# Patient Record
Sex: FEMALE | Race: WHITE | NOT HISPANIC OR LATINO | Employment: OTHER | URBAN - METROPOLITAN AREA
[De-identification: names, ages, dates, MRNs, and addresses within clinical notes are randomized per-mention and may not be internally consistent; named-entity substitution may affect disease eponyms.]

---

## 2017-03-14 ENCOUNTER — TRANSCRIBE ORDERS (OUTPATIENT)
Dept: ADMINISTRATIVE | Facility: HOSPITAL | Age: 82
End: 2017-03-14

## 2017-03-14 DIAGNOSIS — N18.9 CHRONIC RENAL INSUFFICIENCY, UNSPECIFIED STAGE: Primary | ICD-10-CM

## 2017-03-14 DIAGNOSIS — M81.0 OSTEOPOROSIS: ICD-10-CM

## 2017-03-22 ENCOUNTER — HOSPITAL ENCOUNTER (OUTPATIENT)
Dept: ULTRASOUND IMAGING | Facility: HOSPITAL | Age: 82
Discharge: HOME | End: 2017-03-22
Attending: INTERNAL MEDICINE | Admitting: INTERNAL MEDICINE

## 2017-03-22 ENCOUNTER — HOSPITAL ENCOUNTER (OUTPATIENT)
Dept: BONE DENSITY | Facility: HOSPITAL | Age: 82
Discharge: HOME | End: 2017-03-22
Attending: INTERNAL MEDICINE

## 2017-03-22 DIAGNOSIS — N18.9 CHRONIC RENAL INSUFFICIENCY, UNSPECIFIED STAGE: ICD-10-CM

## 2017-03-22 DIAGNOSIS — M81.0 OSTEOPOROSIS: ICD-10-CM

## 2017-03-22 PROCEDURE — 76775 US EXAM ABDO BACK WALL LIM: CPT

## 2017-03-22 PROCEDURE — 77080 DXA BONE DENSITY AXIAL: CPT

## 2017-04-10 ENCOUNTER — TRANSCRIBE ORDERS (OUTPATIENT)
Dept: ADMINISTRATIVE | Facility: HOSPITAL | Age: 82
End: 2017-04-10

## 2017-04-10 DIAGNOSIS — N28.89 RENAL MASS, LEFT: Primary | ICD-10-CM

## 2017-04-11 ENCOUNTER — HOSPITAL ENCOUNTER (OUTPATIENT)
Dept: CT IMAGING | Facility: HOSPITAL | Age: 82
Discharge: HOME | End: 2017-04-11
Attending: INTERNAL MEDICINE | Admitting: INTERNAL MEDICINE

## 2017-04-11 DIAGNOSIS — N28.89 RENAL MASS, LEFT: ICD-10-CM

## 2017-04-11 PROCEDURE — 74150 CT ABDOMEN W/O CONTRAST: CPT

## 2017-04-19 ENCOUNTER — TRANSCRIBE ORDERS (OUTPATIENT)
Dept: ADMINISTRATIVE | Facility: HOSPITAL | Age: 82
End: 2017-04-19

## 2017-04-19 DIAGNOSIS — D41.00 NEOPLASM OF UNCERTAIN BEHAVIOR OF KIDNEY, UNSPECIFIED LATERALITY: Primary | ICD-10-CM

## 2017-04-20 ENCOUNTER — TRANSCRIBE ORDERS (OUTPATIENT)
Dept: ADMINISTRATIVE | Facility: HOSPITAL | Age: 82
End: 2017-04-20

## 2017-04-20 DIAGNOSIS — Z12.31 ENCOUNTER FOR SCREENING MAMMOGRAM FOR MALIGNANT NEOPLASM OF BREAST: Primary | ICD-10-CM

## 2017-04-24 ENCOUNTER — HOSPITAL ENCOUNTER (OUTPATIENT)
Dept: MRI IMAGING | Facility: HOSPITAL | Age: 82
Discharge: HOME | End: 2017-04-24
Attending: UROLOGY | Admitting: UROLOGY

## 2017-04-24 DIAGNOSIS — D41.00 NEOPLASM OF UNCERTAIN BEHAVIOR OF KIDNEY, UNSPECIFIED LATERALITY: ICD-10-CM

## 2017-04-24 PROCEDURE — 74183 MRI ABD W/O CNTR FLWD CNTR: CPT

## 2017-04-24 PROCEDURE — A9577 INJ MULTIHANCE: HCPCS | Performed by: UROLOGY

## 2017-04-24 PROCEDURE — 82565 ASSAY OF CREATININE: CPT

## 2017-04-24 PROCEDURE — 0 GADOBENATE DIMEGLUMINE 529 MG/ML SOLUTION: Performed by: UROLOGY

## 2017-04-24 RX ADMIN — GADOBENATE DIMEGLUMINE 13 ML: 529 INJECTION, SOLUTION INTRAVENOUS at 07:00

## 2017-04-26 LAB — CREAT BLDA-MCNC: 1.1 MG/DL (ref 0.6–1.3)

## 2018-02-13 ENCOUNTER — OFFICE (OUTPATIENT)
Dept: URBAN - METROPOLITAN AREA OTHER 6 | Facility: OTHER | Age: 83
End: 2018-02-13

## 2018-02-13 VITALS
WEIGHT: 153 LBS | HEART RATE: 74 BPM | DIASTOLIC BLOOD PRESSURE: 70 MMHG | HEIGHT: 66 IN | SYSTOLIC BLOOD PRESSURE: 112 MMHG

## 2018-02-13 DIAGNOSIS — K59.00 CONSTIPATION, UNSPECIFIED: ICD-10-CM

## 2018-02-13 DIAGNOSIS — K21.9 GASTRO-ESOPHAGEAL REFLUX DISEASE WITHOUT ESOPHAGITIS: ICD-10-CM

## 2018-02-13 PROCEDURE — 99212 OFFICE O/P EST SF 10 MIN: CPT

## 2019-08-23 ENCOUNTER — ANESTHESIA EVENT (OUTPATIENT)
Dept: GASTROENTEROLOGY | Facility: HOSPITAL | Age: 84
End: 2019-08-23

## 2019-08-23 ENCOUNTER — ANESTHESIA (OUTPATIENT)
Dept: GASTROENTEROLOGY | Facility: HOSPITAL | Age: 84
End: 2019-08-23

## 2019-08-23 ENCOUNTER — HOSPITAL ENCOUNTER (OUTPATIENT)
Facility: HOSPITAL | Age: 84
Setting detail: HOSPITAL OUTPATIENT SURGERY
Discharge: HOME | End: 2019-08-23
Attending: INTERNAL MEDICINE | Admitting: INTERNAL MEDICINE

## 2019-08-23 ENCOUNTER — ON CAMPUS - OUTPATIENT (OUTPATIENT)
Dept: URBAN - METROPOLITAN AREA HOSPITAL 114 | Facility: HOSPITAL | Age: 84
End: 2019-08-23

## 2019-08-23 VITALS
HEIGHT: 66 IN | RESPIRATION RATE: 16 BRPM | OXYGEN SATURATION: 96 % | WEIGHT: 156.44 LBS | BODY MASS INDEX: 25.14 KG/M2 | SYSTOLIC BLOOD PRESSURE: 136 MMHG | HEART RATE: 63 BPM | DIASTOLIC BLOOD PRESSURE: 79 MMHG | TEMPERATURE: 97.7 F

## 2019-08-23 DIAGNOSIS — K31.89 OTHER DISEASES OF STOMACH AND DUODENUM: ICD-10-CM

## 2019-08-23 DIAGNOSIS — R68.81 EARLY SATIETY: ICD-10-CM

## 2019-08-23 DIAGNOSIS — K44.9 DIAPHRAGMATIC HERNIA WITHOUT OBSTRUCTION OR GANGRENE: ICD-10-CM

## 2019-08-23 DIAGNOSIS — R12 HEARTBURN: ICD-10-CM

## 2019-08-23 DIAGNOSIS — R10.13 DYSPEPSIA: ICD-10-CM

## 2019-08-23 PROCEDURE — 88305 TISSUE EXAM BY PATHOLOGIST: CPT | Performed by: INTERNAL MEDICINE

## 2019-08-23 PROCEDURE — 87071 CULTURE AEROBIC QUANT OTHER: CPT | Performed by: INTERNAL MEDICINE

## 2019-08-23 PROCEDURE — 43239 EGD BIOPSY SINGLE/MULTIPLE: CPT | Performed by: INTERNAL MEDICINE

## 2019-08-23 PROCEDURE — 25010000002 PROPOFOL 10 MG/ML EMULSION: Performed by: ANESTHESIOLOGY

## 2019-08-23 RX ORDER — LIDOCAINE HYDROCHLORIDE 20 MG/ML
INJECTION, SOLUTION INFILTRATION; PERINEURAL AS NEEDED
Status: DISCONTINUED | OUTPATIENT
Start: 2019-08-23 | End: 2019-08-23 | Stop reason: SURG

## 2019-08-23 RX ORDER — SODIUM CHLORIDE, SODIUM LACTATE, POTASSIUM CHLORIDE, CALCIUM CHLORIDE 600; 310; 30; 20 MG/100ML; MG/100ML; MG/100ML; MG/100ML
1000 INJECTION, SOLUTION INTRAVENOUS CONTINUOUS
Status: DISCONTINUED | OUTPATIENT
Start: 2019-08-23 | End: 2019-08-23 | Stop reason: HOSPADM

## 2019-08-23 RX ORDER — SIMVASTATIN 40 MG
40 TABLET ORAL NIGHTLY
COMMUNITY

## 2019-08-23 RX ORDER — PROPOFOL 10 MG/ML
VIAL (ML) INTRAVENOUS AS NEEDED
Status: DISCONTINUED | OUTPATIENT
Start: 2019-08-23 | End: 2019-08-23 | Stop reason: SURG

## 2019-08-23 RX ORDER — ZOLPIDEM TARTRATE 5 MG/1
5 TABLET ORAL NIGHTLY PRN
COMMUNITY
End: 2022-08-29

## 2019-08-23 RX ORDER — PANTOPRAZOLE SODIUM 40 MG/1
40 TABLET, DELAYED RELEASE ORAL DAILY
COMMUNITY

## 2019-08-23 RX ORDER — SODIUM CHLORIDE 0.9 % (FLUSH) 0.9 %
3 SYRINGE (ML) INJECTION AS NEEDED
Status: DISCONTINUED | OUTPATIENT
Start: 2019-08-23 | End: 2019-08-23 | Stop reason: HOSPADM

## 2019-08-23 RX ORDER — IBANDRONATE SODIUM 150 MG/1
150 TABLET, FILM COATED ORAL
COMMUNITY

## 2019-08-23 RX ORDER — CITALOPRAM 40 MG/1
40 TABLET ORAL DAILY
COMMUNITY

## 2019-08-23 RX ORDER — PROPOFOL 10 MG/ML
VIAL (ML) INTRAVENOUS CONTINUOUS PRN
Status: DISCONTINUED | OUTPATIENT
Start: 2019-08-23 | End: 2019-08-23 | Stop reason: SURG

## 2019-08-23 RX ADMIN — PROPOFOL 100 MG: 10 INJECTION, EMULSION INTRAVENOUS at 10:40

## 2019-08-23 RX ADMIN — LIDOCAINE HYDROCHLORIDE 60 MG: 20 INJECTION, SOLUTION INFILTRATION; PERINEURAL at 10:40

## 2019-08-23 RX ADMIN — PROPOFOL 100 MCG/KG/MIN: 10 INJECTION, EMULSION INTRAVENOUS at 10:40

## 2019-08-23 RX ADMIN — SODIUM CHLORIDE, POTASSIUM CHLORIDE, SODIUM LACTATE AND CALCIUM CHLORIDE 1000 ML: 600; 310; 30; 20 INJECTION, SOLUTION INTRAVENOUS at 10:02

## 2019-08-23 NOTE — ANESTHESIA POSTPROCEDURE EVALUATION
"Patient: Maria R Sandoval    Procedure Summary     Date:  08/23/19 Room / Location:   [facility] /  [facility]    Anesthesia Start:  1036 Anesthesia Stop:  1102    Procedure:  ESOPHAGOGASTRODUODENOSCOPY WITH DUODENAL ASPIRATE AND BIOPSY (N/A Esophagus) Diagnosis:      Surgeon:  Curtis Dillard MD Provider:  Ricci James MD    Anesthesia Type:  MAC ASA Status:  2          Anesthesia Type: MAC  Last vitals  BP   136/79 (08/23/19 1121)   Temp   36.5 °C (97.7 °F) (08/23/19 0945)   Pulse   63 (08/23/19 1121)   Resp   16 (08/23/19 1121)     SpO2   96 % (08/23/19 1121)     Post Anesthesia Care and Evaluation    Patient location during evaluation: PACU  Patient participation: complete - patient participated  Level of consciousness: awake  Pain score: 0  Pain management: adequate  Airway patency: patent  Anesthetic complications: No anesthetic complications  PONV Status: none  Cardiovascular status: acceptable  Respiratory status: acceptable  Hydration status: acceptable    Comments: /79 (BP Location: Left arm, Patient Position: Sitting)   Pulse 63   Temp 36.5 °C (97.7 °F) (Oral)   Resp 16   Ht 167.6 cm (66\")   Wt 71 kg (156 lb 7 oz)   SpO2 96%   BMI 25.25 kg/m²       "

## 2019-08-23 NOTE — ANESTHESIA PREPROCEDURE EVALUATION
Anesthesia Evaluation     Patient summary reviewed and Nursing notes reviewed                Airway   Mallampati: I  TM distance: >3 FB  Neck ROM: full  No difficulty expected  Dental - normal exam     Pulmonary - negative pulmonary ROS and normal exam   Cardiovascular - normal exam    (+) hypertension, hyperlipidemia,       Neuro/Psych- negative ROS  GI/Hepatic/Renal/Endo    (+)  GERD,      Musculoskeletal     Abdominal  - normal exam    Bowel sounds: normal.   Substance History - negative use     OB/GYN negative ob/gyn ROS         Other   (+) arthritis                     Anesthesia Plan    ASA 2     MAC     Anesthetic plan, all risks, benefits, and alternatives have been provided, discussed and informed consent has been obtained with: patient.

## 2019-08-23 NOTE — H&P
Patient Care Team:  Adeel Interiano MD as PCP - General (Internal Medicine)    Chief complaint reflux, prior ulcer     Subjective     History of Present Illness  States she has upper abdominal pain, fills up easily  Review of Systems   Gastrointestinal: Positive for abdominal pain.        Past Medical History:   Diagnosis Date   • Arthritis    • Elevated cholesterol    • GERD (gastroesophageal reflux disease)    • Hypertension      Past Surgical History:   Procedure Laterality Date   • ABDOMINOPLASTY N/A    • ANKLE SURGERY Right    • BILATERAL BREAST REDUCTION     • COLONOSCOPY N/A 2016    Procedure: COLONOSCOPY to terminal ileum;  Surgeon: Curtis Dillard MD;  Location: [facility] END[facility]COPY;  Service:    • ENDOSCOPY N/A 2016    Procedure: ESOPHAGOGASTRODUODENOSCOPY with bx;  Surgeon: Curtis Dillard MD;  Location: [facility];  Service:    • EYE SURGERY Right    • FACELIFT     • REPLACEMENT TOTAL KNEE Right    • ROTATOR CUFF REPAIR Left    • TONSILLECTOMY     • WRIST SURGERY Right      Family History   Problem Relation Age of Onset   • Cancer Maternal Grandmother      Social History     Tobacco Use   • Smoking status: Former Smoker     Last attempt to quit: 1985     Years since quittin.7   Substance Use Topics   • Alcohol use: No   • Drug use: No     Medications Prior to Admission   Medication Sig Dispense Refill Last Dose   • Calcium Carb-Cholecalciferol (CALCIUM 600 + D PO) Take  by mouth.   2019 at Unknown time   • citalopram (CeleXA) 40 MG tablet Take 40 mg by mouth Daily.   2019 at Unknown time   • ibandronate (BONIVA) 150 MG tablet Take 150 mg by mouth Every 30 (Thirty) Days.      • Mirabegron (MYRBETRIQ PO) Take  by mouth.   2019 at Unknown time   • Multiple Vitamins-Calcium (ONE-A-DAY WOMENS PO) Take  by mouth.   2019 at Unknown time   • pantoprazole (PROTONIX) 40 MG EC tablet Take 40 mg by mouth Daily.      • simvastatin (ZOCOR) 40 MG tablet Take 40 mg by mouth  Every Night.      • triamterene (DYRENIUM) 50 MG capsule Take 25 mg by mouth Daily.   Past Week at Unknown time   • zolpidem (AMBIEN) 5 MG tablet Take 5 mg by mouth At Night As Needed for Sleep.        Allergies:  Codeine sulfate    Objective      Vital Signs  Temp:  [97.7 °F (36.5 °C)] 97.7 °F (36.5 °C)  Heart Rate:  [68] 68  Resp:  [11] 11  BP: (128)/(76) 128/76    Physical Exam   Constitutional: She is oriented to person, place, and time. She appears well-developed and well-nourished.   HENT:   Mouth/Throat: Oropharynx is clear and moist.   Eyes: Conjunctivae are normal.   Neck: Neck supple.   Cardiovascular: Normal rate and regular rhythm.   Pulmonary/Chest: Effort normal and breath sounds normal.   Abdominal: Soft. Bowel sounds are normal.   Neurological: She is alert and oriented to person, place, and time.   Skin: Skin is warm and dry.   Psychiatric: She has a normal mood and affect.       Results Review:   I reviewed the patient's new clinical results.      Assessment/Plan       * No active hospital problems. *      Assessment:  (Gastroesophageal reflux disease  Early satiety  dyspepsia).     Plan:   (Upper tract endoscopy, risks, alternatives and benefits discussed with patient and patient is agreeable to proceed).       I discussed the patients findings and my recommendations with patient and nursing staff    Curtis Dillard MD  08/23/19  10:40 AM     Kaitlin -

## 2019-08-23 NOTE — DISCHARGE INSTRUCTIONS
For the next 24 hours patient needs to be with a responsible adult.    For 24 hours DO NOT drive, operate machinery, appliances, drink alcohol, make important decisions or sign legal documents.    Start with a light or bland diet if you are feeling sick to your stomach otherwise advance to regular diet as tolerated.    Follow recommendations on procedure report if provided by your doctor.    Call Dr Dillard for problems [phone]    Problems may include but not limited to: large amounts of bleeding, trouble breathing, repeated vomiting, severe unrelieved pain, fever or chills.

## 2019-08-25 LAB
BACTERIA SPEC AEROBE CULT: ABNORMAL
BACTERIA SPEC AEROBE CULT: ABNORMAL

## 2019-08-26 LAB
CYTO UR: NORMAL
LAB AP CASE REPORT: NORMAL
PATH REPORT.FINAL DX SPEC: NORMAL
PATH REPORT.GROSS SPEC: NORMAL

## 2021-01-13 ENCOUNTER — OFFICE VISIT (OUTPATIENT)
Dept: ORTHOPEDIC SURGERY | Facility: CLINIC | Age: 86
End: 2021-01-13

## 2021-01-13 VITALS — WEIGHT: 160 LBS | BODY MASS INDEX: 25.71 KG/M2 | TEMPERATURE: 97.3 F | HEIGHT: 66 IN

## 2021-01-13 DIAGNOSIS — R52 PAIN: ICD-10-CM

## 2021-01-13 DIAGNOSIS — M17.12 PRIMARY OSTEOARTHRITIS OF LEFT KNEE: Primary | ICD-10-CM

## 2021-01-13 DIAGNOSIS — Z96.651 STATUS POST TOTAL RIGHT KNEE REPLACEMENT: ICD-10-CM

## 2021-01-13 PROCEDURE — 73562 X-RAY EXAM OF KNEE 3: CPT | Performed by: ORTHOPAEDIC SURGERY

## 2021-01-13 PROCEDURE — 99203 OFFICE O/P NEW LOW 30 MIN: CPT | Performed by: ORTHOPAEDIC SURGERY

## 2021-01-13 PROCEDURE — 20610 DRAIN/INJ JOINT/BURSA W/O US: CPT | Performed by: ORTHOPAEDIC SURGERY

## 2021-01-13 RX ORDER — METHYLPREDNISOLONE ACETATE 80 MG/ML
80 INJECTION, SUSPENSION INTRA-ARTICULAR; INTRALESIONAL; INTRAMUSCULAR; SOFT TISSUE
Status: COMPLETED | OUTPATIENT
Start: 2021-01-13 | End: 2021-01-13

## 2021-01-13 RX ORDER — OLOPATADINE HYDROCHLORIDE 7 MG/ML
SOLUTION OPHTHALMIC
COMMUNITY
Start: 2020-10-19

## 2021-01-13 RX ORDER — MELOXICAM 15 MG/1
TABLET ORAL DAILY
COMMUNITY
Start: 2020-11-12

## 2021-01-13 RX ADMIN — METHYLPREDNISOLONE ACETATE 80 MG: 80 INJECTION, SUSPENSION INTRA-ARTICULAR; INTRALESIONAL; INTRAMUSCULAR; SOFT TISSUE at 09:35

## 2021-01-13 NOTE — PROGRESS NOTES
Patient Name: Maria R Sandoval   YOB: 1935  Referring Primary Care Physician: Adeel Interiano MD  BMI: Body mass index is 25.82 kg/m².    Chief Complaint:    Chief Complaint   Patient presents with   • Left Knee - Establish Care   • Right Knee - Establish Care        HPI:     Maria R Sandoval is a 85 y.o. female who presents today for evaluation of   Chief Complaint   Patient presents with   • Left Knee - Establish Care   • Right Knee - Establish Care   .  Mrs. Sutton is seen today complaining of some bilateral knee pain but mostly on the left.  Denies any trauma she had a right total knee many years ago.  Left knee started to bother more more and she think she is relying on the other side.  She does have some back pain and her the distance of ambulation is somewhat limited as well.  She does not have any pain at rest      Subjective   Medications:   Home Medications:  Current Outpatient Medications on File Prior to Visit   Medication Sig   • Calcium Carb-Cholecalciferol (CALCIUM 600 + D PO) Take  by mouth.   • citalopram (CeleXA) 40 MG tablet Take 40 mg by mouth Daily.   • ibandronate (BONIVA) 150 MG tablet Take 150 mg by mouth Every 30 (Thirty) Days.   • meloxicam (MOBIC) 15 MG tablet Take  by mouth Daily.   • Mirabegron (MYRBETRIQ PO) Take  by mouth.   • Multiple Vitamins-Calcium (ONE-A-DAY WOMENS PO) Take  by mouth.   • pantoprazole (PROTONIX) 40 MG EC tablet Take 40 mg by mouth Daily.   • Pazeo 0.7 % solution INT 1 GTT IN OU QAM   • simvastatin (ZOCOR) 40 MG tablet Take 40 mg by mouth Every Night.   • triamterene (DYRENIUM) 50 MG capsule Take 25 mg by mouth Daily.   • zolpidem (AMBIEN) 5 MG tablet Take 5 mg by mouth At Night As Needed for Sleep.     No current facility-administered medications on file prior to visit.      Current Medications:  Scheduled Meds:  Continuous Infusions:No current facility-administered medications for this visit.     PRN Meds:.    I have reviewed the  patient's medical history in detail and updated the computerized patient record.  Review and summarization of old records includes:    Past Medical History:   Diagnosis Date   • Arthritis    • Elevated cholesterol    • GERD (gastroesophageal reflux disease)    • Hypertension         Past Surgical History:   Procedure Laterality Date   • ABDOMINOPLASTY N/A    • ANKLE SURGERY Right    • BILATERAL BREAST REDUCTION     • COLONOSCOPY N/A 2016    Procedure: COLONOSCOPY to terminal ileum;  Surgeon: Curtis Dillard MD;  Location: [facility] ENDOSCOPY;  Service:    • ENDOSCOPY N/A 2016    Procedure: ESOPHAGOGASTRODUODENOSCOPY with bx;  Surgeon: Curtis Dillard MD;  Location: [facility] ENDOSCOPY;  Service:    • ENDOSCOPY N/A 2019    Procedure: ESOPHAGOGASTRODUODENOSCOPY WITH DUODENAL ASPIRATE AND BIOPSY;  Surgeon: Curtis Dillard MD;  Location: [facility] ENDOSCOPY;  Service: Gastroenterology   • EYE SURGERY Right    • FACELIFT     • REPLACEMENT TOTAL KNEE Right    • ROTATOR CUFF REPAIR Left    • TONSILLECTOMY     • WRIST SURGERY Right         Social History     Occupational History   • Not on file   Tobacco Use   • Smoking status: Former Smoker     Quit date: 1985     Years since quittin.1   • Smokeless tobacco: Never Used   Substance and Sexual Activity   • Alcohol use: No   • Drug use: No   • Sexual activity: Not on file      Social History     Social History Narrative   • Not on file        Family History   Problem Relation Age of Onset   • Cancer Maternal Grandmother        ROS: 14 point review of systems was performed and all other systems were reviewed and are negative except for documented findings in HPI and today's encounter.     Allergies:   Allergies   Allergen Reactions   • Codeine Sulfate Rash     Constitutional:  Denies fever, shaking or chills   Eyes:  Denies change in visual acuity   HENT:  Denies nasal congestion or sore throat   Respiratory:  Denies cough or shortness of breath  "  Cardiovascular:  Denies chest pain or severe LE edema   GI:  Denies abdominal pain, nausea, vomiting, bloody stools or diarrhea   Musculoskeletal:  Numbness, tingling, pain, or loss of motor function only as noted above in history of present illness.  : Denies painful urination or hematuria  Integument:  Denies rash, lesion or ulceration   Neurologic:  Denies headache or focal weakness  Endocrine:  Denies lymphadenopathy  Psych:  Denies confusion or change in mental status   Hem:  Denies active bleeding    OBJECTIVE:  Physical Exam: 85 y.o. female  Wt Readings from Last 3 Encounters:   01/13/21 72.6 kg (160 lb)   08/23/19 71 kg (156 lb 7 oz)   04/24/17 68 kg (150 lb)     Ht Readings from Last 1 Encounters:   01/13/21 167.6 cm (66\")     Body mass index is 25.82 kg/m².  Vitals:    01/13/21 0925   Temp: 97.3 °F (36.3 °C)     Vital signs reviewed.     General Appearance:    Alert, cooperative, in no acute distress                  Eyes: conjunctiva clear  ENT: external ears and nose atraumatic  CV: no peripheral edema  Resp: normal respiratory effort  Skin: no rashes or wounds; normal turgor  Psych: mood and affect appropriate  Lymph: no nodes appreciated  Neuro: gross sensation intact  Vascular:  Palpable peripheral pulse in noted extremity  Musculoskeletal Extremities: Examination today replaced knee goes 0 to about 125 degrees there is mild varus valgus laxity but no instability is demonstrated and there is minimal swelling.  Not tender to the touch her left knee has swelling crepitation synovitis joint line tenderness and pseudolaxity    Radiology:   AP lateral 40 degree PA x-ray bilateral knees taken the office today for pain with comparison view shows a well-placed well fixed well aligned total knee arthroplasty on the right with arthritis on the left is relatively advanced.  Reviewing her old x-rays AP lateral lumbar spine from several years ago she has scoliosis and degenerative disc disease may perhaps have " some stenosis adding to the symptoms.  Also reviewed previous AP pelvis that shows bilateral sacroiliac changes as well she is tender in that area as well    Assessment:     ICD-10-CM ICD-9-CM   1. Pain  R52 780.96   2. Status post total right knee replacement  Z96.651 V43.65   3. Primary osteoarthritis of left knee  M17.12 715.16        Large Joint Arthrocentesis: L knee  Date/Time: 1/13/2021 9:35 AM  Consent given by: patient  Site marked: site marked  Timeout: Immediately prior to procedure a time out was called to verify the correct patient, procedure, equipment, support staff and site/side marked as required   Supporting Documentation  Indications: pain and joint swelling   Procedure Details  Location: knee - L knee  Preparation: Patient was prepped and draped in the usual sterile fashion  Needle gauge: 21.  Approach: anterolateral  Medications administered: 4 mL lidocaine (cardiac); 80 mg methylPREDNISolone acetate 80 MG/ML  Patient tolerance: patient tolerated the procedure well with no immediate complications             Plan: The diagnosis(es), natural history, pathophysiology and treatment for diagnosis(es) were discussed. Opportunity given and questions answered.  Biomechanics of pertinent body areas discussed.  When appropriate, the use of ambulatory aids discussed.  BMI:  The concept of BMI body mass index and its importance and implications discussed.    Inflammation/pain control; with cold, heat, elevation and/or liniments discussed as appropriate  Cortisone Injection. See procedure note.  We discussed options including total knee arthroplasty.  She is 85 in reasonably good health.  Not sure if she wants to do that but we did discuss and answer questions.  When we see her back about 3 months to see how she is getting along      1/13/2021    Much of this encounter note is an electronic transcription/translation of spoken language to printed text. The electronic translation of spoken language may permit  erroneous, or at times, nonsensical words or phrases to be inadvertently transcribed; Although I have reviewed the note for such errors, some may still exist

## 2021-04-15 ENCOUNTER — OFFICE VISIT (OUTPATIENT)
Dept: ORTHOPEDIC SURGERY | Facility: CLINIC | Age: 86
End: 2021-04-15

## 2021-04-15 VITALS — TEMPERATURE: 96.8 F | BODY MASS INDEX: 25.72 KG/M2 | WEIGHT: 160.05 LBS | HEIGHT: 66 IN

## 2021-04-15 DIAGNOSIS — M17.12 ARTHRITIS OF LEFT KNEE: Primary | ICD-10-CM

## 2021-04-15 DIAGNOSIS — Z96.651 STATUS POST TOTAL RIGHT KNEE REPLACEMENT: ICD-10-CM

## 2021-04-15 PROCEDURE — 99214 OFFICE O/P EST MOD 30 MIN: CPT | Performed by: ORTHOPAEDIC SURGERY

## 2021-04-15 NOTE — PROGRESS NOTES
Patient Name: Maria R Sandoval   YOB: 1935  Referring Primary Care Physician: Adeel Interiano MD  BMI: Body mass index is 25.85 kg/m².    Chief Complaint:    Chief Complaint   Patient presents with   • Left Knee - Follow-up, Pain   • Right Knee - Follow-up, Pain        HPI:     Maria R Sandoval is a 85 y.o. female who presents today for evaluation of   Chief Complaint   Patient presents with   • Left Knee - Follow-up, Pain   • Right Knee - Follow-up, Pain     She is status post right knee arthroplasty and left knee pain. The left knee has swelling and hurts in the medial and posterior aspects of the knee. She reports instability in the left knee as well. She walks with a cane. She asked about stem cell treatments and knee replacement. She lives alone but has daughters who can come visit to help her. Additionally she has some bilateral hip pain.     Subjective   Medications:   Home Medications:  Current Outpatient Medications on File Prior to Visit   Medication Sig   • Calcium 280 MG tablet Take  by mouth.   • Calcium Carb-Cholecalciferol (CALCIUM 600 + D PO) Take  by mouth.   • citalopram (CeleXA) 40 MG tablet Take 40 mg by mouth Daily.   • ibandronate (BONIVA) 150 MG tablet Take 150 mg by mouth Every 30 (Thirty) Days.   • meloxicam (MOBIC) 15 MG tablet Take  by mouth Daily.   • Mirabegron (MYRBETRIQ PO) Take  by mouth.   • Multiple Vitamins-Calcium (ONE-A-DAY WOMENS PO) Take  by mouth.   • pantoprazole (PROTONIX) 40 MG EC tablet Take 40 mg by mouth Daily.   • Pazeo 0.7 % solution INT 1 GTT IN OU QAM   • simvastatin (ZOCOR) 40 MG tablet Take 40 mg by mouth Every Night.   • triamterene (DYRENIUM) 50 MG capsule Take 25 mg by mouth Daily.   • zolpidem (AMBIEN) 5 MG tablet Take 5 mg by mouth At Night As Needed for Sleep.     No current facility-administered medications on file prior to visit.     Current Medications:  Scheduled Meds:  Continuous Infusions:No current facility-administered  medications for this visit.    PRN Meds:.    I have reviewed the patient's medical history in detail and updated the computerized patient record.  Review and summarization of old records includes:    Past Medical History:   Diagnosis Date   • Arthritis    • Elevated cholesterol    • GERD (gastroesophageal reflux disease)    • Hypertension         Past Surgical History:   Procedure Laterality Date   • ABDOMINOPLASTY N/A    • ANKLE SURGERY Right    • BILATERAL BREAST REDUCTION     • COLONOSCOPY N/A 2016    Procedure: COLONOSCOPY to terminal ileum;  Surgeon: Curtis Dillard MD;  Location: [facility] ENDOSCOPY;  Service:    • ENDOSCOPY N/A 2016    Procedure: ESOPHAGOGASTRODUODENOSCOPY with bx;  Surgeon: Curtis Dillard MD;  Location: [facility] ENDOSCOPY;  Service:    • ENDOSCOPY N/A 2019    Procedure: ESOPHAGOGASTRODUODENOSCOPY WITH DUODENAL ASPIRATE AND BIOPSY;  Surgeon: Curtis Dillard MD;  Location: [facility] ENDOSCOPY;  Service: Gastroenterology   • EYE SURGERY Right    • FACELIFT     • REPLACEMENT TOTAL KNEE Right    • ROTATOR CUFF REPAIR Left    • TONSILLECTOMY     • WRIST SURGERY Right         Social History     Occupational History   • Not on file   Tobacco Use   • Smoking status: Former Smoker     Quit date: 1985     Years since quittin.3   • Smokeless tobacco: Never Used   Substance and Sexual Activity   • Alcohol use: No   • Drug use: No   • Sexual activity: Not on file      Social History     Social History Narrative   • Not on file        Family History   Problem Relation Age of Onset   • Cancer Maternal Grandmother        ROS: 14 point review of systems was performed and all other systems were reviewed and are negative except for documented findings in HPI and today's encounter.     Allergies:   Allergies   Allergen Reactions   • Raloxifene Other (See Comments)     Other reaction(s): LEG CRAMPING   • Risedronate Other (See Comments)   • Codeine Sulfate Rash   • Hydrocodone-Acetaminophen  "Rash     Constitutional:  Denies fever, shaking or chills   Eyes:  Denies change in visual acuity   HENT:  Denies nasal congestion or sore throat   Respiratory:  Denies cough or shortness of breath   Cardiovascular:  Denies chest pain or severe LE edema   GI:  Denies abdominal pain, nausea, vomiting, bloody stools or diarrhea   Musculoskeletal:  Numbness, tingling, pain, or loss of motor function only as noted above in history of present illness.  : Denies painful urination or hematuria  Integument:  Denies rash, lesion or ulceration   Neurologic:  Denies headache or focal weakness  Endocrine:  Denies lymphadenopathy  Psych:  Denies confusion or change in mental status   Hem:  Denies active bleeding    OBJECTIVE:  Physical Exam: 85 y.o. female  Wt Readings from Last 3 Encounters:   04/15/21 72.6 kg (160 lb 0.9 oz)   01/13/21 72.6 kg (160 lb)   08/23/19 71 kg (156 lb 7 oz)     Ht Readings from Last 1 Encounters:   04/15/21 167.6 cm (65.98\")     Body mass index is 25.85 kg/m².  Vitals:    04/15/21 1035   Temp: 96.8 °F (36 °C)     Vital signs reviewed.     General Appearance:    Alert, cooperative, in no acute distress                  Eyes: conjunctiva clear  ENT: external ears and nose atraumatic  CV: no peripheral edema  Resp: normal respiratory effort  Skin: no rashes or wounds; normal turgor  Psych: mood and affect appropriate  Lymph: no nodes appreciated  Neuro: gross sensation intact  Vascular:  Palpable peripheral pulse in noted extremity  Musculoskeletal Extremities: Left knee: Swelling present on exam with good range of motion.   Right hip: Tenderness to the right sacroiliac joint. stinchfield negative, no pain with internal and external rotation     Radiology:   AP lateral 40 degree PA x-ray bilateral knees taken the office in the past with comparison views for pain and reviewed for possible surgery show total knee arthroplasty on the right with advanced arthritis left knee      Procedures      Assessment: "     ICD-10-CM ICD-9-CM   1. Arthritis of left knee  M17.12 716.96   2. Status post total right knee replacement  Z96.651 V43.65        MDM/Plan:   The diagnosis(es), natural history, pathophysiology and treatment for diagnosis(es) were discussed. Opportunity given and questions answered.  Biomechanics of pertinent body areas discussed.  When appropriate, the use of ambulatory aids discussed. She does not currently want a cortisone injection but suspects she will want it in the future.     Inflammation/pain control; with cold, heat, elevation and/or liniments discussed as appropriate  TKA: Continuation of conservative management vs. TKA: I reviewed anatomy of a total knee arthroplasty in laymen's terms, as well as typical postoperative recovery and possibly 6-12 months for maximal recovery, and possible need for rehabilitation stay after hospitalization. We also discussed risks, benefits, alternatives, and limitations of procedure with risks including but not limited to neurovascular damage, bleeding, infection, malalignment, chronic pain, failure of implants, osteolysis, loosening of implants, loss of motion, weakness, stiffness, instability, DVT, pulmonary embolus, death, stroke, complex regional pain syndrome, myocardial infarction, and need for additional procedures. Concept of substitution vs. replacement discussed.  No guarantees were given regarding results of surgery.  Patient verbalized understanding, and was given the opportunity to ask and have all questions answered today.     Scribed for Christiano Dillard MD by SEBASTIEN HENNING.  04/15/21   12:50 EDT    I have personally performed the services described in this document as scribed by the above individual, and it is both accurate and complete.  Christiano Dillard MD  4/16/2021  11:39 EDT

## 2021-06-01 ENCOUNTER — TELEPHONE (OUTPATIENT)
Dept: ORTHOPEDIC SURGERY | Facility: CLINIC | Age: 86
End: 2021-06-01

## 2022-03-07 ENCOUNTER — TELEPHONE (OUTPATIENT)
Dept: ONCOLOGY | Facility: CLINIC | Age: 87
End: 2022-03-07

## 2022-03-07 NOTE — TELEPHONE ENCOUNTER
Caller: Maria R Sandoval    Relationship to patient: Self    Best call back number: [phone]    Chief complaint: R/S NEW PT APPT    Type of visit: LAB NEW PT APPT    Requested date: 3-10, 3-11, 3-14, 3-16, 3-17, 3-18     If rescheduling, when is the original appointment: 3-9    Additional notes:PLEASE ADVISE

## 2022-03-09 ENCOUNTER — APPOINTMENT (OUTPATIENT)
Dept: OTHER | Facility: HOSPITAL | Age: 87
End: 2022-03-09

## 2022-03-09 ENCOUNTER — APPOINTMENT (OUTPATIENT)
Dept: LAB | Facility: HOSPITAL | Age: 87
End: 2022-03-09

## 2022-03-14 ENCOUNTER — LAB (OUTPATIENT)
Dept: LAB | Facility: HOSPITAL | Age: 87
End: 2022-03-14

## 2022-03-14 ENCOUNTER — CONSULT (OUTPATIENT)
Dept: ONCOLOGY | Facility: CLINIC | Age: 87
End: 2022-03-14

## 2022-03-14 VITALS
SYSTOLIC BLOOD PRESSURE: 161 MMHG | RESPIRATION RATE: 16 BRPM | BODY MASS INDEX: 26.67 KG/M2 | OXYGEN SATURATION: 96 % | HEART RATE: 81 BPM | TEMPERATURE: 97.4 F | HEIGHT: 64 IN | WEIGHT: 156.2 LBS | DIASTOLIC BLOOD PRESSURE: 89 MMHG

## 2022-03-14 DIAGNOSIS — R79.89 ABNORMAL CBC: Primary | ICD-10-CM

## 2022-03-14 DIAGNOSIS — D47.2 MGUS (MONOCLONAL GAMMOPATHY OF UNKNOWN SIGNIFICANCE): Primary | ICD-10-CM

## 2022-03-14 LAB
BASOPHILS # BLD AUTO: 0.04 10*3/MM3 (ref 0–0.2)
BASOPHILS NFR BLD AUTO: 0.5 % (ref 0–1.5)
DEPRECATED RDW RBC AUTO: 39.8 FL (ref 37–54)
EOSINOPHIL # BLD AUTO: 0.16 10*3/MM3 (ref 0–0.4)
EOSINOPHIL NFR BLD AUTO: 1.9 % (ref 0.3–6.2)
ERYTHROCYTE [DISTWIDTH] IN BLOOD BY AUTOMATED COUNT: 12.3 % (ref 12.3–15.4)
HCT VFR BLD AUTO: 41.9 % (ref 34–46.6)
HGB BLD-MCNC: 14.1 G/DL (ref 12–15.9)
IMM GRANULOCYTES # BLD AUTO: 0.04 10*3/MM3 (ref 0–0.05)
IMM GRANULOCYTES NFR BLD AUTO: 0.5 % (ref 0–0.5)
LYMPHOCYTES # BLD AUTO: 1.95 10*3/MM3 (ref 0.7–3.1)
LYMPHOCYTES NFR BLD AUTO: 22.8 % (ref 19.6–45.3)
MCH RBC QN AUTO: 29.6 PG (ref 26.6–33)
MCHC RBC AUTO-ENTMCNC: 33.7 G/DL (ref 31.5–35.7)
MCV RBC AUTO: 87.8 FL (ref 79–97)
MONOCYTES # BLD AUTO: 0.63 10*3/MM3 (ref 0.1–0.9)
MONOCYTES NFR BLD AUTO: 7.4 % (ref 5–12)
NEUTROPHILS NFR BLD AUTO: 5.74 10*3/MM3 (ref 1.7–7)
NEUTROPHILS NFR BLD AUTO: 66.9 % (ref 42.7–76)
NRBC BLD AUTO-RTO: 0 /100 WBC (ref 0–0.2)
PLATELET # BLD AUTO: 221 10*3/MM3 (ref 140–450)
PMV BLD AUTO: 9.4 FL (ref 6–12)
RBC # BLD AUTO: 4.77 10*6/MM3 (ref 3.77–5.28)
WBC NRBC COR # BLD: 8.56 10*3/MM3 (ref 3.4–10.8)

## 2022-03-14 PROCEDURE — 85025 COMPLETE CBC W/AUTO DIFF WBC: CPT

## 2022-03-14 PROCEDURE — 36415 COLL VENOUS BLD VENIPUNCTURE: CPT

## 2022-03-14 PROCEDURE — 99204 OFFICE O/P NEW MOD 45 MIN: CPT | Performed by: INTERNAL MEDICINE

## 2022-03-14 RX ORDER — TRAMADOL HYDROCHLORIDE 50 MG/1
50 TABLET ORAL
COMMUNITY
Start: 2022-02-23 | End: 2022-08-29 | Stop reason: SDUPTHER

## 2022-03-14 RX ORDER — MELOXICAM 15 MG/1
15 TABLET ORAL DAILY
COMMUNITY
Start: 2021-10-11

## 2022-03-14 RX ORDER — FLUTICASONE PROPIONATE 50 MCG
1 SPRAY, SUSPENSION (ML) NASAL DAILY
COMMUNITY

## 2022-03-14 RX ORDER — TRAMADOL HYDROCHLORIDE 50 MG/1
TABLET ORAL
COMMUNITY
Start: 2022-02-23

## 2022-03-14 RX ORDER — ROSUVASTATIN CALCIUM 5 MG/1
TABLET, COATED ORAL
COMMUNITY
Start: 2022-01-11

## 2022-03-14 RX ORDER — ROSUVASTATIN CALCIUM 5 MG/1
5 TABLET, COATED ORAL DAILY
COMMUNITY
Start: 2021-10-20 | End: 2022-08-29 | Stop reason: ALTCHOICE

## 2022-03-14 NOTE — PROGRESS NOTES
Subjective     REASON FOR CONSULTATION:  MGUS  Provide an opinion on any further workup or treatment                             REQUESTING PHYSICIAN:      RECORDS OBTAINED:  Records of the patients history including those obtained from the referring provider were reviewed and summarized in detail.    HISTORY OF PRESENT ILLNESS:  The patient is a 86 y.o. year old female who is here for an opinion about the above issue.    History of Present Illness   This is a pleasant 86-year-old lady with history of hyperlipidemia, hypertension, osteopenia and osteoarthritis referred from her PCP for evaluation of an abnormal serum protein electrophoresis.  The patient complained to her PCP with intermittent numbness and tingling primarily affecting the feet.  As part of the evaluation for peripheral neuropathy labs were performed on 2/16/2022 including a CBC which showed a normal white blood cell count 6.1, hemoglobin 14.8 and platelets 256.  B12 level was normal 658.  CMP showed a normal serum creatinine of 0.97, normal total protein 7.3 and normal globulin 3.1.  The calcium level was normal 9.5.  A serum protein electrophoresis showed an abnormal protein band which was too small to quantitate.  Immunofixation showed a faint abnormal IgG kappa monoclonal immunoglobulin.  The total IgG was normal 1353.  No free light chain ratio was available.  A protein electrophoresis was subsequently repeated and stable.    Past Medical History:   Diagnosis Date   • Arthritis    • Carotid artery stenosis    • Elevated cholesterol    • GERD (gastroesophageal reflux disease)    • Hypertension    • IBS (irritable bowel syndrome)    • Osteopenia    • Vitamin D deficiency         Past Surgical History:   Procedure Laterality Date   • ABDOMINOPLASTY N/A    • ANKLE SURGERY Right    • BILATERAL BREAST REDUCTION     • BREAST SURGERY     • COLONOSCOPY N/A 12/07/2016    Procedure: COLONOSCOPY to terminal ileum;  Surgeon: Curtis Dillard MD;  Location:   [facility];  Service:    • ENDOSCOPY N/A 12/07/2016    Procedure: ESOPHAGOGASTRODUODENOSCOPY with bx;  Surgeon: Curtis Dillard MD;  Location: [facility] ENDOSCOPY;  Service:    • ENDOSCOPY N/A 08/23/2019    Procedure: ESOPHAGOGASTRODUODENOSCOPY WITH DUODENAL ASPIRATE AND BIOPSY;  Surgeon: Curtis Dillard MD;  Location: [facility] ENDOSCOPY;  Service: Gastroenterology   • EYE SURGERY Right    • FACELIFT     • HERNIA REPAIR     • NISSEN FUNDOPLICATION     • REPLACEMENT TOTAL KNEE Right    • ROTATOR CUFF REPAIR Left    • TONSILLECTOMY     • WRIST SURGERY Right         Current Outpatient Medications on File Prior to Visit   Medication Sig Dispense Refill   • Calcium 280 MG tablet Take  by mouth.     • Calcium Carb-Cholecalciferol (CALCIUM 600 + D PO) Take  by mouth.     • citalopram (CeleXA) 40 MG tablet Take 40 mg by mouth Daily.     • co-enzyme Q-10 30 MG capsule Take 30 mg by mouth Daily.     • fluticasone (FLONASE) 50 MCG/ACT nasal spray 1 spray into the nostril(s) as directed by provider Daily.     • ibandronate (BONIVA) 150 MG tablet Take 150 mg by mouth Every 30 (Thirty) Days.     • meloxicam (MOBIC) 15 MG tablet Take  by mouth Daily.     • meloxicam (MOBIC) 15 MG tablet Take 15 mg by mouth Daily.     • Mirabegron (MYRBETRIQ PO) Take  by mouth.     • Multiple Vitamins-Calcium (ONE-A-DAY WOMENS PO) Take  by mouth.     • pantoprazole (PROTONIX) 40 MG EC tablet Take 40 mg by mouth Daily.     • Pazeo 0.7 % solution INT 1 GTT IN OU QAM     • rosuvastatin (CRESTOR) 5 MG tablet Take 5 mg by mouth Daily.     • simvastatin (ZOCOR) 40 MG tablet Take 40 mg by mouth Every Night.     • traMADol (ULTRAM) 50 MG tablet      • traMADol (ULTRAM) 50 MG tablet Take 50 mg by mouth.     • triamterene (DYRENIUM) 50 MG capsule Take 25 mg by mouth Daily.     • vitamin D3 125 MCG (5000 UT) capsule capsule Take  by mouth.     • zolpidem (AMBIEN) 5 MG tablet Take 5 mg by mouth At Night As Needed for Sleep.     • rosuvastatin (CRESTOR) 5 MG  "tablet        No current facility-administered medications on file prior to visit.        ALLERGIES:    Allergies   Allergen Reactions   • Simvastatin Unknown - Low Severity     Myalgia    • Raloxifene Other (See Comments)     Other reaction(s): LEG CRAMPING   • Risedronate Other (See Comments)   • Codeine Sulfate Rash   • Hydrocodone-Acetaminophen Rash        Social History     Socioeconomic History   • Marital status:    Tobacco Use   • Smoking status: Former Smoker     Quit date: 1985     Years since quittin.2   • Smokeless tobacco: Never Used   Substance and Sexual Activity   • Alcohol use: No   • Drug use: No        Family History   Problem Relation Age of Onset   • Hypertension Mother    • Depression Mother    • Alzheimer's disease Mother    • Stroke Mother    • Prostate cancer Father    • Alcohol abuse Sister    • Bipolar disorder Sister    • Stroke Brother    • Alcohol abuse Brother    • Lymphoma Brother    • Cancer Maternal Grandmother         Review of Systems   Constitutional: Negative.    HENT: Negative.    Eyes: Negative.    Respiratory: Negative.    Cardiovascular: Negative.    Gastrointestinal: Negative.    Musculoskeletal: Positive for arthralgias and joint swelling.   Skin: Negative.    Neurological: Positive for numbness.   Hematological: Negative.    Psychiatric/Behavioral: Negative.           Objective     Vitals:    22 1259   BP: 161/89   Pulse: 81   Resp: 16   Temp: 97.4 °F (36.3 °C)   TempSrc: Temporal   SpO2: 96%   Weight: 70.9 kg (156 lb 3.2 oz)   Height: 163 cm (64.17\")   PainSc: 0-No pain     Current Status 3/14/2022   ECOG score 1       Physical Exam    CONSTITUTIONAL: pleasant well-developed adult woman  HEENT: no icterus, no thrush, moist membranes  NECK: no jvd  LYMPH: no cervical or supraclavicular lad  CV: RRR, S1S2, no murmur  RESP: cta bilat, no wheezing, no rales  GI: soft, non-tender, no splenomegaly, +bs  MUSC: no edema, normal gait, arthritic changes " hands most notable thumb joints  NEURO: alert and oriented x3, normal strength  PSYCH: normal mood and affect    RECENT LABS:  Hematology WBC   Date Value Ref Range Status   03/14/2022 8.56 3.40 - 10.80 10*3/mm3 Final     RBC   Date Value Ref Range Status   03/14/2022 4.77 3.77 - 5.28 10*6/mm3 Final     Hemoglobin   Date Value Ref Range Status   03/14/2022 14.1 12.0 - 15.9 g/dL Final     Hematocrit   Date Value Ref Range Status   03/14/2022 41.9 34.0 - 46.6 % Final     Platelets   Date Value Ref Range Status   03/14/2022 221 140 - 450 10*3/mm3 Final        Lab Results   Component Value Date    CREATININE 1.10 04/24/2017     CMP/SPEP/SARWAT reviewed from primary care outlined in the HPI    Assessment/Plan     *Monoclonal gammopathy of undetermined significance, IgG kappa  · Patient noted to have abnormal SPEP/SARWAT during evaluation for peripheral neuropathy February 2022  · Labs February 2022 showed an nonquantifiable M spike, IgG kappa (total IgG normal 1353) with no evidence of endorgan damage including normal hemoglobin, normal platelet count, normal renal function, no hypercalcemia    *Peripheral neuropathy-unclear but unlikely related to monoclonal gammopathy    Hematology-oncology plan/recommendations:   I discussed diagnosis of monoclonal gammopathy of undetermined significance with the patient in clinic today.  Presently she has a protein band too small to quantify with no evidence of endorgan damage and this is likely no clinical significance.  We discussed that approximately 1% of patients per year with MGUS can have subsequent increase in protein levels leading to endorgan damage resulting in a diagnosis of multiple myeloma.  For this reason, the paraproteinemia deserves continued surveillance/observation.  For this patient, I suspect the protein will never be of clinical significance.  We will have her back in 6 months with a repeat CBC, CMP, SPEP/SARWAT and a free light chain ratio to monitor.    Thank you for  allowing me to participate in the care of this pleasant patient.

## 2022-08-22 ENCOUNTER — APPOINTMENT (OUTPATIENT)
Dept: LAB | Facility: HOSPITAL | Age: 87
End: 2022-08-22

## 2022-08-24 ENCOUNTER — LAB (OUTPATIENT)
Dept: LAB | Facility: HOSPITAL | Age: 87
End: 2022-08-24

## 2022-08-24 DIAGNOSIS — D47.2 MGUS (MONOCLONAL GAMMOPATHY OF UNKNOWN SIGNIFICANCE): ICD-10-CM

## 2022-08-24 DIAGNOSIS — D47.2 MGUS (MONOCLONAL GAMMOPATHY OF UNKNOWN SIGNIFICANCE): Primary | ICD-10-CM

## 2022-08-24 LAB
ALBUMIN SERPL-MCNC: 4.3 G/DL (ref 3.5–5.2)
ALBUMIN/GLOB SERPL: 1.3 G/DL (ref 1.1–2.4)
ALP SERPL-CCNC: 69 U/L (ref 38–116)
ALT SERPL W P-5'-P-CCNC: 12 U/L (ref 0–33)
ANION GAP SERPL CALCULATED.3IONS-SCNC: 12.6 MMOL/L (ref 5–15)
AST SERPL-CCNC: 20 U/L (ref 0–32)
BASOPHILS # BLD AUTO: 0.04 10*3/MM3 (ref 0–0.2)
BASOPHILS NFR BLD AUTO: 0.7 % (ref 0–1.5)
BILIRUB SERPL-MCNC: 0.4 MG/DL (ref 0.2–1.2)
BUN SERPL-MCNC: 14 MG/DL (ref 6–20)
BUN/CREAT SERPL: 14.4 (ref 7.3–30)
CALCIUM SPEC-SCNC: 9.8 MG/DL (ref 8.5–10.2)
CHLORIDE SERPL-SCNC: 101 MMOL/L (ref 98–107)
CO2 SERPL-SCNC: 25.4 MMOL/L (ref 22–29)
CREAT SERPL-MCNC: 0.97 MG/DL (ref 0.6–1.1)
DEPRECATED RDW RBC AUTO: 43.7 FL (ref 37–54)
EGFRCR SERPLBLD CKD-EPI 2021: 57.4 ML/MIN/1.73
EOSINOPHIL # BLD AUTO: 0.13 10*3/MM3 (ref 0–0.4)
EOSINOPHIL NFR BLD AUTO: 2.2 % (ref 0.3–6.2)
ERYTHROCYTE [DISTWIDTH] IN BLOOD BY AUTOMATED COUNT: 13.2 % (ref 12.3–15.4)
GLOBULIN UR ELPH-MCNC: 3.2 GM/DL (ref 1.8–3.5)
GLUCOSE SERPL-MCNC: 90 MG/DL (ref 74–124)
HCT VFR BLD AUTO: 42.3 % (ref 34–46.6)
HGB BLD-MCNC: 13.8 G/DL (ref 12–15.9)
IMM GRANULOCYTES # BLD AUTO: 0.01 10*3/MM3 (ref 0–0.05)
IMM GRANULOCYTES NFR BLD AUTO: 0.2 % (ref 0–0.5)
LYMPHOCYTES # BLD AUTO: 1.79 10*3/MM3 (ref 0.7–3.1)
LYMPHOCYTES NFR BLD AUTO: 31 % (ref 19.6–45.3)
MCH RBC QN AUTO: 29.3 PG (ref 26.6–33)
MCHC RBC AUTO-ENTMCNC: 32.6 G/DL (ref 31.5–35.7)
MCV RBC AUTO: 89.8 FL (ref 79–97)
MONOCYTES # BLD AUTO: 0.46 10*3/MM3 (ref 0.1–0.9)
MONOCYTES NFR BLD AUTO: 8 % (ref 5–12)
NEUTROPHILS NFR BLD AUTO: 3.35 10*3/MM3 (ref 1.7–7)
NEUTROPHILS NFR BLD AUTO: 57.9 % (ref 42.7–76)
NRBC BLD AUTO-RTO: 0 /100 WBC (ref 0–0.2)
PLATELET # BLD AUTO: 196 10*3/MM3 (ref 140–450)
PMV BLD AUTO: 9 FL (ref 6–12)
POTASSIUM SERPL-SCNC: 4.5 MMOL/L (ref 3.5–4.7)
PROT SERPL-MCNC: 7.5 G/DL (ref 6.3–8)
RBC # BLD AUTO: 4.71 10*6/MM3 (ref 3.77–5.28)
SODIUM SERPL-SCNC: 139 MMOL/L (ref 134–145)
WBC NRBC COR # BLD: 5.78 10*3/MM3 (ref 3.4–10.8)

## 2022-08-24 PROCEDURE — 85025 COMPLETE CBC W/AUTO DIFF WBC: CPT

## 2022-08-24 PROCEDURE — 36415 COLL VENOUS BLD VENIPUNCTURE: CPT

## 2022-08-24 PROCEDURE — 80053 COMPREHEN METABOLIC PANEL: CPT

## 2022-08-24 NOTE — PROGRESS NOTES
Subjective     REASON FOR CONSULTATION:  MGUS  Provide an opinion on any further workup or treatment                             REQUESTING PHYSICIAN:      RECORDS OBTAINED:  Records of the patients history including those obtained from the referring provider were reviewed and summarized in detail.    HISTORY OF PRESENT ILLNESS:  The patient is a 87 y.o. year old female who is here for an opinion about the above issue.    History of Present Illness   This is a pleasant 87-year-old lady with history of hyperlipidemia, hypertension, osteopenia and osteoarthritis referred from her PCP for evaluation of an abnormal serum protein electrophoresis.  The patient complained to her PCP with intermittent numbness and tingling primarily affecting the feet.  As part of the evaluation for peripheral neuropathy labs were performed on 2/16/2022 including a CBC which showed a normal white blood cell count 6.1, hemoglobin 14.8 and platelets 256.  B12 level was normal 658.  CMP showed a normal serum creatinine of 0.97, normal total protein 7.3 and normal globulin 3.1.  The calcium level was normal 9.5.  A serum protein electrophoresis showed an abnormal protein band which was too small to quantitate.  Immunofixation showed a faint abnormal IgG kappa monoclonal immunoglobulin.  The total IgG was normal 1353.  No free light chain ratio was available.  A protein electrophoresis was subsequently repeated and stable.    The patient follows up today for repeat lab evaluation.  She reports stable to improved neuropathy in the feet.  No other new complaints today.    Past Medical History:   Diagnosis Date   • Arthritis    • Carotid artery stenosis    • Elevated cholesterol    • GERD (gastroesophageal reflux disease)    • Hypertension    • IBS (irritable bowel syndrome)    • Osteopenia    • Vitamin D deficiency         Past Surgical History:   Procedure Laterality Date   • ABDOMINOPLASTY N/A    • ANKLE SURGERY Right    • BILATERAL BREAST  REDUCTION     • BREAST SURGERY     • COLONOSCOPY N/A 12/07/2016    Procedure: COLONOSCOPY to terminal ileum;  Surgeon: Curtis Dillard MD;  Location: [facility];  Service:    • ENDOSCOPY N/A 12/07/2016    Procedure: ESOPHAGOGASTRODUODENOSCOPY with bx;  Surgeon: Curtis Dillard MD;  Location: [facility];  Service:    • ENDOSCOPY N/A 08/23/2019    Procedure: ESOPHAGOGASTRODUODENOSCOPY WITH DUODENAL ASPIRATE AND BIOPSY;  Surgeon: Curtis Dillard MD;  Location: [facility]COPY;  Service: Gastroenterology   • EYE SURGERY Right    • FACELIFT     • HERNIA REPAIR     • NISSEN FUNDOPLICATION     • REPLACEMENT TOTAL KNEE Right    • ROTATOR CUFF REPAIR Left    • TONSILLECTOMY     • WRIST SURGERY Right         Current Outpatient Medications on File Prior to Visit   Medication Sig Dispense Refill   • azelastine (ASTELIN) 0.1 % nasal spray 2 sprays into the nostril(s) as directed by provider As Needed for Rhinitis. Use in each nostril as directed     • Calcium Carb-Cholecalciferol (CALCIUM 600 + D PO) Take  by mouth.     • citalopram (CeleXA) 40 MG tablet Take 40 mg by mouth Daily.     • fluticasone (FLONASE) 50 MCG/ACT nasal spray 1 spray into the nostril(s) as directed by provider Daily.     • meloxicam (MOBIC) 15 MG tablet Take 15 mg by mouth Daily.     • Mirabegron (MYRBETRIQ PO) Take  by mouth.     • Multiple Vitamins-Calcium (ONE-A-DAY WOMENS PO) Take  by mouth.     • rosuvastatin (CRESTOR) 5 MG tablet      • traMADol (ULTRAM) 50 MG tablet      • vitamin D3 125 MCG (5000 UT) capsule capsule Take  by mouth.     • [DISCONTINUED] Calcium 280 MG tablet Take  by mouth.     • Acetaminophen (TYLENOL 8 HOUR PO) Take  by mouth As Needed.     • co-enzyme Q-10 30 MG capsule Take 30 mg by mouth Daily.     • ibandronate (BONIVA) 150 MG tablet Take 150 mg by mouth Every 30 (Thirty) Days.     • meloxicam (MOBIC) 15 MG tablet Take  by mouth Daily.     • pantoprazole (PROTONIX) 40 MG EC tablet Take 40 mg by mouth Daily.     •  Pazeo 0.7 % solution INT 1 GTT IN OU QAM     • simvastatin (ZOCOR) 40 MG tablet Take 40 mg by mouth Every Night.     • traMADol (ULTRAM) 50 MG tablet Take 50 mg by mouth.     • triamterene (DYRENIUM) 50 MG capsule Take 25 mg by mouth Daily.     • [DISCONTINUED] rosuvastatin (CRESTOR) 5 MG tablet Take 5 mg by mouth Daily.     • [DISCONTINUED] zolpidem (AMBIEN) 5 MG tablet Take 5 mg by mouth At Night As Needed for Sleep.       No current facility-administered medications on file prior to visit.        ALLERGIES:    Allergies   Allergen Reactions   • Simvastatin Unknown - Low Severity     Myalgia    • Raloxifene Other (See Comments)     Other reaction(s): LEG CRAMPING   • Risedronate Other (See Comments)   • Codeine Sulfate Rash   • Hydrocodone-Acetaminophen Rash        Social History     Socioeconomic History   • Marital status:    Tobacco Use   • Smoking status: Former Smoker     Packs/day: 1.00     Years: 30.00     Pack years: 30.00     Quit date: 1985     Years since quittin.7   • Smokeless tobacco: Never Used   Substance and Sexual Activity   • Alcohol use: No   • Drug use: No        Family History   Problem Relation Age of Onset   • Hypertension Mother    • Depression Mother    • Alzheimer's disease Mother    • Stroke Mother    • Prostate cancer Father    • Alcohol abuse Sister    • Bipolar disorder Sister    • Stroke Brother    • Alcohol abuse Brother    • Lymphoma Brother    • Cancer Maternal Grandmother    • Stomach cancer Maternal Grandmother    • Liver cancer Maternal Grandmother    • Diabetes Cousin         Review of Systems   Constitutional: Negative.    HENT: Negative.    Eyes: Negative.    Respiratory: Negative.    Cardiovascular: Negative.    Gastrointestinal: Negative.    Musculoskeletal: Positive for arthralgias and joint swelling.   Skin: Negative.    Neurological: Positive for numbness.   Hematological: Negative.    Psychiatric/Behavioral: Negative.     ROS  "unchanged-8/29/2022      Objective     Vitals:    08/29/22 1324   BP: 155/83   Pulse: 81   Resp: 16   Temp: 97.1 °F (36.2 °C)   TempSrc: Infrared   SpO2: 94%   Weight: 72.1 kg (158 lb 14.4 oz)   Height: 162 cm (63.78\")  Comment: new ht - no shoes   PainSc:   1   PainLoc: Generalized  Comment: all over, arthritis     Current Status 8/29/2022   ECOG score 0       Physical Exam    CONSTITUTIONAL: pleasant well-developed adult woman  CV: RRR, S1S2, no murmur  RESP: cta bilat, no wheezing, no rales  GI: soft, non-tender, no splenomegaly, +bs  MUSC: no edema, normal gait, arthritic changes hands most notable thumb joints  NEURO: alert and oriented x3, normal strength  PSYCH: normal mood and affect    RECENT LABS:  Hematology WBC   Date Value Ref Range Status   08/24/2022 5.78 3.40 - 10.80 10*3/mm3 Final     RBC   Date Value Ref Range Status   08/24/2022 4.71 3.77 - 5.28 10*6/mm3 Final     Hemoglobin   Date Value Ref Range Status   08/24/2022 13.8 12.0 - 15.9 g/dL Final     Hematocrit   Date Value Ref Range Status   08/24/2022 42.3 34.0 - 46.6 % Final     Platelets   Date Value Ref Range Status   08/24/2022 196 140 - 450 10*3/mm3 Final        Lab Results   Component Value Date    GLUCOSE 90 08/24/2022    BUN 14 08/24/2022    CREATININE 0.97 08/24/2022    BCR 14.4 08/24/2022    K 4.5 08/24/2022    CO2 25.4 08/24/2022    CALCIUM 9.8 08/24/2022    PROTENTOTREF 6.6 08/24/2022    ALBUMIN 4.30 08/24/2022    ALBUMIN 3.5 08/24/2022    LABIL2 1.2 08/24/2022    AST 20 08/24/2022    ALT 12 08/24/2022     SPEP/SARWAT-asymmetrical gamma, presence of monoclonal protein unclear  FLC-1.98  Assessment & Plan     *Monoclonal gammopathy of undetermined significance, IgG kappa  · Patient noted to have abnormal SPEP/SARWAT during evaluation for peripheral neuropathy February 2022  · Labs February 2022 showed an nonquantifiable M spike, IgG kappa (total IgG normal 1353) with no evidence of endorgan damage including normal hemoglobin, normal platelet " count, normal renal function, no hypercalcemia  · Labs 8/24/2022 nonquantifiable M spike/asymmetrical gamma, IgG total 1209, normal renal function, no hypercalcemia, free light chain ratio 1.  98    *Peripheral neuropathy-unclear but unlikely related to monoclonal gammopathy    Hematology-oncology plan/recommendations:   I discussed diagnosis of monoclonal gammopathy of undetermined significance with the patient in clinic today.  Presently she has a protein band too small to quantify with no evidence of endorgan damage and this is likely no clinical significance.  We discussed that approximately 1% of patients per year with MGUS can have subsequent increase in protein levels leading to endorgan damage resulting in a diagnosis of multiple myeloma.  For this reason, the paraproteinemia deserves continued surveillance/observation.  For this patient, I suspect the protein will never be of clinical significance.  We will have her back in 12 months with a repeat CBC, CMP, SPEP/SARWAT and a free light chain ratio to monitor.

## 2022-08-26 LAB
ALBUMIN SERPL ELPH-MCNC: 3.5 G/DL (ref 2.9–4.4)
ALBUMIN/GLOB SERPL: 1.2 {RATIO} (ref 0.7–1.7)
ALPHA1 GLOB SERPL ELPH-MCNC: 0.2 G/DL (ref 0–0.4)
ALPHA2 GLOB SERPL ELPH-MCNC: 0.7 G/DL (ref 0.4–1)
B-GLOBULIN SERPL ELPH-MCNC: 1 G/DL (ref 0.7–1.3)
GAMMA GLOB SERPL ELPH-MCNC: 1.2 G/DL (ref 0.4–1.8)
GLOBULIN SER-MCNC: 3.1 G/DL (ref 2.2–3.9)
IGA SERPL-MCNC: 253 MG/DL (ref 64–422)
IGG SERPL-MCNC: 1209 MG/DL (ref 586–1602)
IGM SERPL-MCNC: 167 MG/DL (ref 26–217)
INTERPRETATION SERPL IEP-IMP: ABNORMAL
KAPPA LC FREE SER-MCNC: 45.1 MG/L (ref 3.3–19.4)
KAPPA LC FREE/LAMBDA FREE SER: 1.98 {RATIO} (ref 0.26–1.65)
LABORATORY COMMENT REPORT: ABNORMAL
LAMBDA LC FREE SERPL-MCNC: 22.8 MG/L (ref 5.7–26.3)
M PROTEIN SERPL ELPH-MCNC: ABNORMAL G/DL
PROT SERPL-MCNC: 6.6 G/DL (ref 6–8.5)

## 2022-08-29 ENCOUNTER — OFFICE VISIT (OUTPATIENT)
Dept: ONCOLOGY | Facility: CLINIC | Age: 87
End: 2022-08-29

## 2022-08-29 ENCOUNTER — APPOINTMENT (OUTPATIENT)
Dept: LAB | Facility: HOSPITAL | Age: 87
End: 2022-08-29

## 2022-08-29 VITALS
DIASTOLIC BLOOD PRESSURE: 83 MMHG | BODY MASS INDEX: 27.13 KG/M2 | HEIGHT: 64 IN | OXYGEN SATURATION: 94 % | HEART RATE: 81 BPM | RESPIRATION RATE: 16 BRPM | TEMPERATURE: 97.1 F | WEIGHT: 158.9 LBS | SYSTOLIC BLOOD PRESSURE: 155 MMHG

## 2022-08-29 DIAGNOSIS — D47.2 MGUS (MONOCLONAL GAMMOPATHY OF UNKNOWN SIGNIFICANCE): Primary | ICD-10-CM

## 2022-08-29 PROCEDURE — 99213 OFFICE O/P EST LOW 20 MIN: CPT | Performed by: INTERNAL MEDICINE

## 2022-08-29 RX ORDER — AZELASTINE 1 MG/ML
2 SPRAY, METERED NASAL AS NEEDED
COMMUNITY

## 2023-08-15 ENCOUNTER — LAB (OUTPATIENT)
Dept: LAB | Facility: HOSPITAL | Age: 88
End: 2023-08-15
Payer: MEDICARE

## 2023-08-15 DIAGNOSIS — D47.2 MGUS (MONOCLONAL GAMMOPATHY OF UNKNOWN SIGNIFICANCE): ICD-10-CM

## 2023-08-15 LAB
ALBUMIN SERPL-MCNC: 3.9 G/DL (ref 3.5–5.2)
ALBUMIN/GLOB SERPL: 1.6 G/DL
ALP SERPL-CCNC: 55 U/L (ref 39–117)
ALT SERPL W P-5'-P-CCNC: 10 U/L (ref 1–33)
ANION GAP SERPL CALCULATED.3IONS-SCNC: 13.8 MMOL/L (ref 5–15)
AST SERPL-CCNC: 22 U/L (ref 1–32)
BASOPHILS # BLD AUTO: 0.04 10*3/MM3 (ref 0–0.2)
BASOPHILS NFR BLD AUTO: 0.9 % (ref 0–1.5)
BILIRUB SERPL-MCNC: 0.4 MG/DL (ref 0–1.2)
BUN SERPL-MCNC: 18 MG/DL (ref 8–23)
BUN/CREAT SERPL: 19.6 (ref 7–25)
CALCIUM SPEC-SCNC: 9.2 MG/DL (ref 8.6–10.5)
CHLORIDE SERPL-SCNC: 104 MMOL/L (ref 98–107)
CO2 SERPL-SCNC: 23.2 MMOL/L (ref 22–29)
CREAT SERPL-MCNC: 0.92 MG/DL (ref 0.6–1.1)
DEPRECATED RDW RBC AUTO: 43.8 FL (ref 37–54)
EGFRCR SERPLBLD CKD-EPI 2021: 60.8 ML/MIN/1.73
EOSINOPHIL # BLD AUTO: 0.14 10*3/MM3 (ref 0–0.4)
EOSINOPHIL NFR BLD AUTO: 3.3 % (ref 0.3–6.2)
ERYTHROCYTE [DISTWIDTH] IN BLOOD BY AUTOMATED COUNT: 13 % (ref 12.3–15.4)
GLOBULIN UR ELPH-MCNC: 2.4 GM/DL
GLUCOSE SERPL-MCNC: 68 MG/DL (ref 65–99)
HCT VFR BLD AUTO: 44.5 % (ref 34–46.6)
HGB BLD-MCNC: 14.4 G/DL (ref 12–15.9)
IMM GRANULOCYTES # BLD AUTO: 0.01 10*3/MM3 (ref 0–0.05)
IMM GRANULOCYTES NFR BLD AUTO: 0.2 % (ref 0–0.5)
LYMPHOCYTES # BLD AUTO: 1.37 10*3/MM3 (ref 0.7–3.1)
LYMPHOCYTES NFR BLD AUTO: 32.3 % (ref 19.6–45.3)
MCH RBC QN AUTO: 29.5 PG (ref 26.6–33)
MCHC RBC AUTO-ENTMCNC: 32.4 G/DL (ref 31.5–35.7)
MCV RBC AUTO: 91.2 FL (ref 79–97)
MONOCYTES # BLD AUTO: 0.36 10*3/MM3 (ref 0.1–0.9)
MONOCYTES NFR BLD AUTO: 8.5 % (ref 5–12)
NEUTROPHILS NFR BLD AUTO: 2.32 10*3/MM3 (ref 1.7–7)
NEUTROPHILS NFR BLD AUTO: 54.8 % (ref 42.7–76)
NRBC BLD AUTO-RTO: 0 /100 WBC (ref 0–0.2)
PLATELET # BLD AUTO: 199 10*3/MM3 (ref 140–450)
PMV BLD AUTO: 8.8 FL (ref 6–12)
POTASSIUM SERPL-SCNC: 4.3 MMOL/L (ref 3.5–5.2)
PROT SERPL-MCNC: 6.3 G/DL (ref 6–8.5)
RBC # BLD AUTO: 4.88 10*6/MM3 (ref 3.77–5.28)
SODIUM SERPL-SCNC: 141 MMOL/L (ref 136–145)
WBC NRBC COR # BLD: 4.24 10*3/MM3 (ref 3.4–10.8)

## 2023-08-15 PROCEDURE — 85025 COMPLETE CBC W/AUTO DIFF WBC: CPT

## 2023-08-15 PROCEDURE — 36415 COLL VENOUS BLD VENIPUNCTURE: CPT

## 2023-08-15 PROCEDURE — 80053 COMPREHEN METABOLIC PANEL: CPT

## 2023-08-16 LAB
ALBUMIN SERPL ELPH-MCNC: 3.7 G/DL (ref 2.9–4.4)
ALBUMIN/GLOB SERPL: 1.2 {RATIO} (ref 0.7–1.7)
ALPHA1 GLOB SERPL ELPH-MCNC: 0.2 G/DL (ref 0–0.4)
ALPHA2 GLOB SERPL ELPH-MCNC: 0.7 G/DL (ref 0.4–1)
B-GLOBULIN SERPL ELPH-MCNC: 1 G/DL (ref 0.7–1.3)
GAMMA GLOB SERPL ELPH-MCNC: 1.2 G/DL (ref 0.4–1.8)
GLOBULIN SER-MCNC: 3.1 G/DL (ref 2.2–3.9)
IGA SERPL-MCNC: 254 MG/DL (ref 64–422)
IGG SERPL-MCNC: 1184 MG/DL (ref 586–1602)
IGM SERPL-MCNC: 164 MG/DL (ref 26–217)
INTERPRETATION SERPL IEP-IMP: ABNORMAL
KAPPA LC FREE SER-MCNC: 50.9 MG/L (ref 3.3–19.4)
KAPPA LC FREE/LAMBDA FREE SER: 2.15 {RATIO} (ref 0.26–1.65)
LABORATORY COMMENT REPORT: ABNORMAL
LAMBDA LC FREE SERPL-MCNC: 23.7 MG/L (ref 5.7–26.3)
M PROTEIN SERPL ELPH-MCNC: 0.3 G/DL
PROT SERPL-MCNC: 6.8 G/DL (ref 6–8.5)

## 2023-08-24 ENCOUNTER — OFFICE VISIT (OUTPATIENT)
Dept: ONCOLOGY | Facility: CLINIC | Age: 88
End: 2023-08-24
Payer: MEDICARE

## 2023-08-24 VITALS
HEART RATE: 85 BPM | BODY MASS INDEX: 28.58 KG/M2 | TEMPERATURE: 97.5 F | DIASTOLIC BLOOD PRESSURE: 82 MMHG | OXYGEN SATURATION: 95 % | SYSTOLIC BLOOD PRESSURE: 151 MMHG | HEIGHT: 64 IN | WEIGHT: 167.4 LBS

## 2023-08-24 DIAGNOSIS — D47.2 MGUS (MONOCLONAL GAMMOPATHY OF UNKNOWN SIGNIFICANCE): Primary | ICD-10-CM

## 2023-08-24 PROCEDURE — 1126F AMNT PAIN NOTED NONE PRSNT: CPT | Performed by: INTERNAL MEDICINE

## 2023-08-24 PROCEDURE — 99213 OFFICE O/P EST LOW 20 MIN: CPT | Performed by: INTERNAL MEDICINE

## 2023-08-24 RX ORDER — GABAPENTIN 100 MG/1
100 CAPSULE ORAL
COMMUNITY
Start: 2023-07-17

## 2023-08-24 NOTE — PROGRESS NOTES
Subjective     REASON FOR CONSULTATION:  MGUS  Provide an opinion on any further workup or treatment                             REQUESTING PHYSICIAN:      RECORDS OBTAINED:  Records of the patients history including those obtained from the referring provider were reviewed and summarized in detail.    HISTORY OF PRESENT ILLNESS:  The patient is a 88 y.o. year old female who is here for an opinion about the above issue.    History of Present Illness   This is a pleasant 87-year-old lady with history of hyperlipidemia, hypertension, osteopenia and osteoarthritis referred from her PCP for evaluation of an abnormal serum protein electrophoresis.  The patient complained to her PCP with intermittent numbness and tingling primarily affecting the feet.  As part of the evaluation for peripheral neuropathy labs were performed on 2/16/2022 including a CBC which showed a normal white blood cell count 6.1, hemoglobin 14.8 and platelets 256.  B12 level was normal 658.  CMP showed a normal serum creatinine of 0.97, normal total protein 7.3 and normal globulin 3.1.  The calcium level was normal 9.5.  A serum protein electrophoresis showed an abnormal protein band which was too small to quantitate.  Immunofixation showed a faint abnormal IgG kappa monoclonal immunoglobulin.  The total IgG was normal 1353.  No free light chain ratio was available.  A protein electrophoresis was subsequently repeated and stable.    The patient follows up today for repeat lab evaluation.  She reports stable to improved neuropathy in the feet and arthritic pain but no other major medical problems over the year.    Past Medical History:   Diagnosis Date    Arthritis     Carotid artery stenosis     Elevated cholesterol     GERD (gastroesophageal reflux disease)     Hypertension     IBS (irritable bowel syndrome)     Osteopenia     Vitamin D deficiency         Past Surgical History:   Procedure Laterality Date    ABDOMINOPLASTY N/A     ANKLE SURGERY  Right     BILATERAL BREAST REDUCTION      BREAST SURGERY      COLONOSCOPY N/A 12/07/2016    Procedure: COLONOSCOPY to terminal ileum;  Surgeon: Curtis Dillard MD;  Location:  [facility];  Service:     ENDOSCOPY N/A 12/07/2016    Procedure: ESOPHAGOGASTRODUODENOSCOPY with bx;  Surgeon: Curtis Dillard MD;  Location: [facility] END[facility];  Service:     ENDOSCOPY N/A 08/23/2019    Procedure: ESOPHAGOGASTRODUODENOSCOPY WITH DUODENAL ASPIRATE AND BIOPSY;  Surgeon: Curtis Dillard MD;  Location: [facility] ENDOSCOPY;  Service: Gastroenterology    EYE SURGERY Right     FACELIFT      HERNIA REPAIR      NISSEN FUNDOPLICATION      REPLACEMENT TOTAL KNEE Right     ROTATOR CUFF REPAIR Left     TONSILLECTOMY      WRIST SURGERY Right         Current Outpatient Medications on File Prior to Visit   Medication Sig Dispense Refill    Acetaminophen (TYLENOL 8 HOUR PO) Take  by mouth As Needed.      azelastine (ASTELIN) 0.1 % nasal spray 2 sprays into the nostril(s) as directed by provider As Needed for Rhinitis. Use in each nostril as directed      citalopram (CeleXA) 40 MG tablet Take 1 tablet by mouth Daily.      fluticasone (FLONASE) 50 MCG/ACT nasal spray 1 spray into the nostril(s) as directed by provider Daily.      gabapentin (NEURONTIN) 100 MG capsule Take 1 capsule by mouth.      meloxicam (MOBIC) 15 MG tablet Take 1 tablet by mouth Daily.      Mirabegron (MYRBETRIQ PO) Take  by mouth.      Multiple Vitamins-Calcium (ONE-A-DAY WOMENS PO) Take  by mouth.      rosuvastatin (CRESTOR) 5 MG tablet       vitamin D3 125 MCG (5000 UT) capsule capsule Take  by mouth.      [DISCONTINUED] Calcium Carb-Cholecalciferol (CALCIUM 600 + D PO) Take  by mouth.      [DISCONTINUED] ibandronate (BONIVA) 150 MG tablet Take 150 mg by mouth Every 30 (Thirty) Days.      [DISCONTINUED] meloxicam (MOBIC) 15 MG tablet Take  by mouth Daily.      [DISCONTINUED] pantoprazole (PROTONIX) 40 MG EC tablet Take 40 mg by mouth Daily.      [DISCONTINUED] Pazeo 0.7  "% solution INT 1 GTT IN OU QAM      [DISCONTINUED] simvastatin (ZOCOR) 40 MG tablet Take 40 mg by mouth Every Night.      [DISCONTINUED] traMADol (ULTRAM) 50 MG tablet       [DISCONTINUED] triamterene (DYRENIUM) 50 MG capsule Take 25 mg by mouth Daily.       No current facility-administered medications on file prior to visit.        ALLERGIES:    Allergies   Allergen Reactions    Simvastatin Unknown - Low Severity     Myalgia     Raloxifene Other (See Comments)     Other reaction(s): LEG CRAMPING    Risedronate Other (See Comments)    Codeine Sulfate Rash    Hydrocodone-Acetaminophen Rash        Social History     Socioeconomic History    Marital status:    Tobacco Use    Smoking status: Former     Packs/day: 1.00     Years: 30.00     Pack years: 30.00     Types: Cigarettes     Quit date: 1985     Years since quittin.7    Smokeless tobacco: Never   Substance and Sexual Activity    Alcohol use: No    Drug use: No        Family History   Problem Relation Age of Onset    Hypertension Mother     Depression Mother     Alzheimer's disease Mother     Stroke Mother     Prostate cancer Father     Alcohol abuse Sister     Bipolar disorder Sister     Stroke Brother     Alcohol abuse Brother     Lymphoma Brother     Cancer Maternal Grandmother     Stomach cancer Maternal Grandmother     Liver cancer Maternal Grandmother     Diabetes Cousin         Review of Systems   Constitutional: Negative.    HENT: Negative.     Eyes: Negative.    Respiratory: Negative.     Cardiovascular: Negative.    Gastrointestinal: Negative.    Musculoskeletal:  Positive for arthralgias and joint swelling.   Skin: Negative.    Neurological:  Positive for numbness.   Hematological: Negative.    Psychiatric/Behavioral: Negative.    ROS unchanged-2023      Objective     Vitals:    23 0939   BP: 151/82   Pulse: 85   Temp: 97.5 øF (36.4 øC)   TempSrc: Temporal   SpO2: 95%   Weight: 75.9 kg (167 lb 6.4 oz)   Height: 162 cm (63.78\") "   PainSc: 0-No pain         8/24/2023     9:37 AM   Current Status   ECOG score 0       Physical Exam    CONSTITUTIONAL: pleasant well-developed adult woman  CV: RRR, S1S2, no murmur  RESP: cta bilat, no wheezing, no rales  GI: soft, non-tender, no splenomegaly, +bs  MUSC: no edema, normal gait, arthritic changes hands most notable thumb joints  NEURO: alert and oriented x3, normal strength  PSYCH: normal mood and affect  Exam is unchanged-8/24/2023  RECENT LABS:  Hematology WBC   Date Value Ref Range Status   08/15/2023 4.24 3.40 - 10.80 10*3/mm3 Final     RBC   Date Value Ref Range Status   08/15/2023 4.88 3.77 - 5.28 10*6/mm3 Final     Hemoglobin   Date Value Ref Range Status   08/15/2023 14.4 12.0 - 15.9 g/dL Final     Hematocrit   Date Value Ref Range Status   08/15/2023 44.5 34.0 - 46.6 % Final     Platelets   Date Value Ref Range Status   08/15/2023 199 140 - 450 10*3/mm3 Final        Lab Results   Component Value Date    GLUCOSE 68 08/15/2023    BUN 18 08/15/2023    CREATININE 0.92 08/15/2023    BCR 19.6 08/15/2023    K 4.3 08/15/2023    CO2 23.2 08/15/2023    CALCIUM 9.2 08/15/2023    PROTENTOTREF 6.8 08/15/2023    ALBUMIN 3.9 08/15/2023    ALBUMIN 3.7 08/15/2023    LABIL2 1.2 08/15/2023    AST 22 08/15/2023    ALT 10 08/15/2023     SPEP/SARWAT-M spike 0.3 g/dL IgG kappa  FLC-2.15  Assessment & Plan     *Monoclonal gammopathy of undetermined significance, IgG kappa  Patient noted to have abnormal SPEP/SARWAT during evaluation for peripheral neuropathy February 2022  Labs February 2022 showed an nonquantifiable M spike, IgG kappa (total IgG normal 1353) with no evidence of endorgan damage including normal hemoglobin, normal platelet count, normal renal function, no hypercalcemia  Labs 8/24/2022 nonquantifiable M spike/asymmetrical gamma, IgG total 1209, normal renal function, no hypercalcemia, free light chain ratio 1.  98  Labs 8/15/2023-M spike 0.3 g/dL IgG kappa, free light chain ratio 2.15, normal CBC and  CMP    *Peripheral neuropathy-unclear but unlikely related to monoclonal gammopathy    Hematology-oncology plan/recommendations:   Continue observation-1 year follow-up with a repeat CBC CMP SPEP SARWAT and free light chain ratio requested

## 2023-12-06 ENCOUNTER — OFFICE VISIT (OUTPATIENT)
Dept: ORTHOPEDIC SURGERY | Facility: CLINIC | Age: 88
End: 2023-12-06
Payer: MEDICARE

## 2023-12-06 VITALS — WEIGHT: 167.5 LBS | HEIGHT: 65 IN | TEMPERATURE: 97.5 F | BODY MASS INDEX: 27.91 KG/M2

## 2023-12-06 DIAGNOSIS — Z96.651 STATUS POST TOTAL RIGHT KNEE REPLACEMENT: ICD-10-CM

## 2023-12-06 DIAGNOSIS — R52 PAIN: Primary | ICD-10-CM

## 2023-12-06 PROCEDURE — 99213 OFFICE O/P EST LOW 20 MIN: CPT | Performed by: ORTHOPAEDIC SURGERY

## 2023-12-06 RX ORDER — AMLODIPINE BESYLATE AND BENAZEPRIL HYDROCHLORIDE 2.5; 1 MG/1; MG/1
CAPSULE ORAL
COMMUNITY
Start: 2023-12-04

## 2023-12-06 RX ORDER — VIBEGRON 75 MG/1
TABLET, FILM COATED ORAL
COMMUNITY
Start: 2023-11-08

## 2023-12-06 NOTE — PROGRESS NOTES
Patient Name: Maria R Sandoval   YOB: 1935  Referring Primary Care Physician: Leticia Obregon MD  BMI: Body mass index is 27.87 kg/m².    Chief Complaint:    Chief Complaint   Patient presents with    Right Knee - Follow-up, Pain        HPI:     Maria R Sandoval is a 88 y.o. female who presents today for evaluation of   Chief Complaint   Patient presents with    Right Knee - Follow-up, Pain   .  Maria R is seen today complaining of some right knee discomfort.  She had some left knee discomfort and saw her about 3 years ago and injected and she has not had trouble since.  The right knee however she had replacement in 2011.  She denies any trauma.  Denies any fevers chills or antecedent illnesses.  She basically just wanted to get it checked.  She does take Tylenol which helps the pain      Subjective   Medications:   Home Medications:  Current Outpatient Medications on File Prior to Visit   Medication Sig    Acetaminophen (TYLENOL 8 HOUR PO) Take  by mouth As Needed.    amLODIPine-benazepril (LOTREL 2.5-10) 2.5-10 MG per capsule     azelastine (ASTELIN) 0.1 % nasal spray 2 sprays into the nostril(s) as directed by provider As Needed for Rhinitis. Use in each nostril as directed    citalopram (CeleXA) 40 MG tablet Take 1 tablet by mouth Daily.    fluticasone (FLONASE) 50 MCG/ACT nasal spray 1 spray into the nostril(s) as directed by provider Daily.    Gemtesa 75 MG tablet     meloxicam (MOBIC) 15 MG tablet Take 1 tablet by mouth Daily.    Multiple Vitamins-Calcium (ONE-A-DAY WOMENS PO) Take  by mouth.    rosuvastatin (CRESTOR) 5 MG tablet     vitamin D3 125 MCG (5000 UT) capsule capsule Take  by mouth.    gabapentin (NEURONTIN) 100 MG capsule Take 1 capsule by mouth.    Mirabegron (MYRBETRIQ PO) Take  by mouth.     No current facility-administered medications on file prior to visit.     Current Medications:  Scheduled Meds:  Continuous Infusions:No current facility-administered  medications for this visit.    PRN Meds:.    I have reviewed the patient's medical history in detail and updated the computerized patient record.  Review and summarization of old records includes:    Past Medical History:   Diagnosis Date    Arthritis     Carotid artery stenosis     Elevated cholesterol     Fracture of ankle     Right ankle    GERD (gastroesophageal reflux disease)     Hypertension     IBS (irritable bowel syndrome)     Osteopenia     Rotator cuff syndrome     Left shoulder repair    Vitamin D deficiency         Past Surgical History:   Procedure Laterality Date    ABDOMINOPLASTY N/A     ANKLE OPEN REDUCTION INTERNAL FIXATION      ANKLE SURGERY Right     BILATERAL BREAST REDUCTION      BREAST SURGERY      COLONOSCOPY N/A 2016    Procedure: COLONOSCOPY to terminal ileum;  Surgeon: Curtis Dillard MD;  Location: [facility] ENDOSCOPY;  Service:     ENDOSCOPY N/A 2016    Procedure: ESOPHAGOGASTRODUODENOSCOPY with bx;  Surgeon: Curtis Dillard MD;  Location: [facility] ENDOSCOPY;  Service:     ENDOSCOPY N/A 2019    Procedure: ESOPHAGOGASTRODUODENOSCOPY WITH DUODENAL ASPIRATE AND BIOPSY;  Surgeon: Curtis Dillard MD;  Location: [facility] ENDOSCOPY;  Service: Gastroenterology    EYE SURGERY Right     FACELIFT      HERNIA REPAIR      NISSEN FUNDOPLICATION      REPLACEMENT TOTAL KNEE Right     ROTATOR CUFF REPAIR Left     SHOULDER SURGERY      TONSILLECTOMY      WRIST SURGERY Right         Social History     Occupational History    Not on file   Tobacco Use    Smoking status: Former     Packs/day: 1.00     Years: 30.00     Additional pack years: 0.00     Total pack years: 30.00     Types: Cigarettes     Quit date: 1985     Years since quittin.0     Passive exposure: Past    Smokeless tobacco: Never   Vaping Use    Vaping Use: Never used   Substance and Sexual Activity    Alcohol use: No    Drug use: No    Sexual activity: Not Currently      Social History     Social History Narrative    Not  "on file        Family History   Problem Relation Age of Onset    Hypertension Mother     Depression Mother     Alzheimer's disease Mother     Stroke Mother     Prostate cancer Father     Alcohol abuse Sister     Bipolar disorder Sister     Stroke Brother     Alcohol abuse Brother     Lymphoma Brother     Cancer Maternal Grandmother     Stomach cancer Maternal Grandmother     Liver cancer Maternal Grandmother     Diabetes Cousin        ROS: 14 point review of systems was performed and all other systems were reviewed and are negative except for documented findings in HPI and today's encounter.     Allergies:   Allergies   Allergen Reactions    Simvastatin Unknown - Low Severity     Myalgia     Raloxifene Other (See Comments)     Other reaction(s): LEG CRAMPING    Risedronate Other (See Comments)    Codeine Sulfate Rash    Hydrocodone-Acetaminophen Rash     Constitutional:  Denies fever, shaking or chills   Eyes:  Denies change in visual acuity   HENT:  Denies nasal congestion or sore throat   Respiratory:  Denies cough or shortness of breath   Cardiovascular:  Denies chest pain or severe LE edema   GI:  Denies abdominal pain, nausea, vomiting, bloody stools or diarrhea   Musculoskeletal:  Numbness, tingling, pain, or loss of motor function only as noted above in history of present illness.  : Denies painful urination or hematuria  Integument:  Denies rash, lesion or ulceration   Neurologic:  Denies headache or focal weakness  Endocrine:  Denies lymphadenopathy  Psych:  Denies confusion or change in mental status   Hem:  Denies active bleeding    OBJECTIVE:  Physical Exam: 88 y.o. female  Wt Readings from Last 3 Encounters:   12/06/23 76 kg (167 lb 8 oz)   08/24/23 75.9 kg (167 lb 6.4 oz)   08/29/22 72.1 kg (158 lb 14.4 oz)     Ht Readings from Last 1 Encounters:   12/06/23 165.1 cm (65\")     Body mass index is 27.87 kg/m².  Vitals:    12/06/23 0917   Temp: 97.5 °F (36.4 °C)     Vital signs reviewed.     General " Appearance:    Alert, cooperative, in no acute distress                  Eyes: conjunctiva clear  ENT: external ears and nose atraumatic  CV: no peripheral edema  Resp: normal respiratory effort  Skin: no rashes or wounds; normal turgor  Psych: mood and affect appropriate  Lymph: no nodes appreciated  Neuro: gross sensation intact  Vascular:  Palpable peripheral pulse in noted extremity  Musculoskeletal Extremities: Pleasant lady she does not have any effusion in her knee she has range of motion about 0-125 she has good stability the area of pain is along the peripatellar region but it is minor.  If noncontributory to rotation or axial loading left knee has some joint line tenderness little bit of stiffness and no significant swelling.    Radiology:   P lateral sunrise view right knee x-ray taken in the office for total joint follow-up complaints of pain with comparison view shows a well aligned total knee that appears to be well-fixed.  May have some area of pinching along the lateral patella on x-ray.        Assessment:     ICD-10-CM ICD-9-CM   1. Pain  R52 780.96   2. Status post total right knee replacement  Z96.651 V43.65        MDM/Plan:   The diagnosis(es), natural history, pathophysiology and treatment for diagnosis(es) were discussed. Opportunity given and questions answered.  Biomechanics of pertinent body areas discussed.  When appropriate, the use of ambulatory aids discussed.    Biomechanics of pertinent body areas discussed.  When appropriate, the use of ambulatory aids discussed.  MEDICATIONS:  The risks, benefits, warnings,side effects and alternatives of medications discussed.  MEDICAL RECORDS reviewed from other provider(s) for past and current medical history pertinent to this complaint.  Does not appear to have any gross loosening or excessive wear.  I did explain the workups for the knee.  No antecedent illnesses is not red warm and I cannot feel any effusion etc. I think she has some patellar  impingement and it 88 she is not eager to do anything surgical with that suggest we could try some pull-up braces if they are comfortable apparently tried some the hinged braces on she had trouble getting them on herself she could find some kind of brace or sleeve to wear I think that be helpful.  If she has more problems with her pain pain becomes more significant etc. I told her be happy to see her back    12/6/2023    Dictated utilizing Dragon dictation

## 2024-09-09 ENCOUNTER — LAB (OUTPATIENT)
Dept: LAB | Facility: HOSPITAL | Age: 89
End: 2024-09-09
Payer: MEDICARE

## 2024-09-09 DIAGNOSIS — D47.2 MGUS (MONOCLONAL GAMMOPATHY OF UNKNOWN SIGNIFICANCE): Primary | ICD-10-CM

## 2024-09-09 LAB
ALBUMIN SERPL-MCNC: 3.9 G/DL (ref 3.5–5.2)
ALBUMIN/GLOB SERPL: 1.1 G/DL
ALP SERPL-CCNC: 71 U/L (ref 39–117)
ALT SERPL W P-5'-P-CCNC: 15 U/L (ref 1–33)
ANION GAP SERPL CALCULATED.3IONS-SCNC: 11 MMOL/L (ref 5–15)
AST SERPL-CCNC: 20 U/L (ref 1–32)
BASOPHILS # BLD AUTO: 0.05 10*3/MM3 (ref 0–0.2)
BASOPHILS NFR BLD AUTO: 0.7 % (ref 0–1.5)
BILIRUB SERPL-MCNC: 0.6 MG/DL (ref 0–1.2)
BUN SERPL-MCNC: 14 MG/DL (ref 8–23)
BUN/CREAT SERPL: 15.6 (ref 7–25)
CALCIUM SPEC-SCNC: 9.9 MG/DL (ref 8.6–10.5)
CHLORIDE SERPL-SCNC: 101 MMOL/L (ref 98–107)
CO2 SERPL-SCNC: 28 MMOL/L (ref 22–29)
CREAT SERPL-MCNC: 0.9 MG/DL (ref 0.57–1)
DEPRECATED RDW RBC AUTO: 44.6 FL (ref 37–54)
EGFRCR SERPLBLD CKD-EPI 2021: 62 ML/MIN/1.73
EOSINOPHIL # BLD AUTO: 0.39 10*3/MM3 (ref 0–0.4)
EOSINOPHIL NFR BLD AUTO: 5.2 % (ref 0.3–6.2)
ERYTHROCYTE [DISTWIDTH] IN BLOOD BY AUTOMATED COUNT: 13.4 % (ref 12.3–15.4)
GLOBULIN UR ELPH-MCNC: 3.7 GM/DL
GLUCOSE SERPL-MCNC: 110 MG/DL (ref 65–99)
HCT VFR BLD AUTO: 45.3 % (ref 34–46.6)
HGB BLD-MCNC: 14.6 G/DL (ref 12–15.9)
IMM GRANULOCYTES # BLD AUTO: 0.02 10*3/MM3 (ref 0–0.05)
IMM GRANULOCYTES NFR BLD AUTO: 0.3 % (ref 0–0.5)
LYMPHOCYTES # BLD AUTO: 1.78 10*3/MM3 (ref 0.7–3.1)
LYMPHOCYTES NFR BLD AUTO: 23.9 % (ref 19.6–45.3)
MCH RBC QN AUTO: 29.1 PG (ref 26.6–33)
MCHC RBC AUTO-ENTMCNC: 32.2 G/DL (ref 31.5–35.7)
MCV RBC AUTO: 90.4 FL (ref 79–97)
MONOCYTES # BLD AUTO: 0.61 10*3/MM3 (ref 0.1–0.9)
MONOCYTES NFR BLD AUTO: 8.2 % (ref 5–12)
NEUTROPHILS NFR BLD AUTO: 4.59 10*3/MM3 (ref 1.7–7)
NEUTROPHILS NFR BLD AUTO: 61.7 % (ref 42.7–76)
NRBC BLD AUTO-RTO: 0 /100 WBC (ref 0–0.2)
PLATELET # BLD AUTO: 207 10*3/MM3 (ref 140–450)
PMV BLD AUTO: 8.9 FL (ref 6–12)
POTASSIUM SERPL-SCNC: 5.3 MMOL/L (ref 3.5–5.2)
PROT SERPL-MCNC: 7.6 G/DL (ref 6–8.5)
RBC # BLD AUTO: 5.01 10*6/MM3 (ref 3.77–5.28)
SODIUM SERPL-SCNC: 140 MMOL/L (ref 136–145)
WBC NRBC COR # BLD AUTO: 7.44 10*3/MM3 (ref 3.4–10.8)

## 2024-09-09 PROCEDURE — 85025 COMPLETE CBC W/AUTO DIFF WBC: CPT

## 2024-09-09 PROCEDURE — 80053 COMPREHEN METABOLIC PANEL: CPT

## 2024-09-09 PROCEDURE — 36415 COLL VENOUS BLD VENIPUNCTURE: CPT

## 2024-09-10 LAB
ALBUMIN SERPL ELPH-MCNC: 3.5 G/DL (ref 2.9–4.4)
ALBUMIN/GLOB SERPL: 1.1 {RATIO} (ref 0.7–1.7)
ALPHA1 GLOB SERPL ELPH-MCNC: 0.3 G/DL (ref 0–0.4)
ALPHA2 GLOB SERPL ELPH-MCNC: 0.8 G/DL (ref 0.4–1)
B-GLOBULIN SERPL ELPH-MCNC: 1.1 G/DL (ref 0.7–1.3)
GAMMA GLOB SERPL ELPH-MCNC: 1.2 G/DL (ref 0.4–1.8)
GLOBULIN SER-MCNC: 3.4 G/DL (ref 2.2–3.9)
IGA SERPL-MCNC: 260 MG/DL (ref 64–422)
IGG SERPL-MCNC: 1179 MG/DL (ref 586–1602)
IGM SERPL-MCNC: 181 MG/DL (ref 26–217)
INTERPRETATION SERPL IEP-IMP: ABNORMAL
KAPPA LC FREE SER-MCNC: 53.1 MG/L (ref 3.3–19.4)
KAPPA LC FREE/LAMBDA FREE SER: 1.99 {RATIO} (ref 0.26–1.65)
LABORATORY COMMENT REPORT: ABNORMAL
LAMBDA LC FREE SERPL-MCNC: 26.7 MG/L (ref 5.7–26.3)
M PROTEIN SERPL ELPH-MCNC: 0.3 G/DL
PROT SERPL-MCNC: 6.9 G/DL (ref 6–8.5)

## 2024-09-16 ENCOUNTER — TELEPHONE (OUTPATIENT)
Dept: ONCOLOGY | Facility: CLINIC | Age: 89
End: 2024-09-16
Payer: MEDICARE

## 2024-09-16 DIAGNOSIS — D47.2 MGUS (MONOCLONAL GAMMOPATHY OF UNKNOWN SIGNIFICANCE): Primary | ICD-10-CM

## 2025-05-08 ENCOUNTER — OFFICE VISIT (OUTPATIENT)
Dept: ORTHOPEDIC SURGERY | Facility: CLINIC | Age: 89
End: 2025-05-08
Payer: MEDICARE

## 2025-05-08 VITALS — TEMPERATURE: 98.3 F | HEIGHT: 63 IN | WEIGHT: 162 LBS | BODY MASS INDEX: 28.7 KG/M2

## 2025-05-08 DIAGNOSIS — M17.12 PRIMARY OSTEOARTHRITIS OF LEFT KNEE: ICD-10-CM

## 2025-05-08 DIAGNOSIS — R52 PAIN: Primary | ICD-10-CM

## 2025-05-08 RX ORDER — METOPROLOL SUCCINATE 25 MG/1
25 TABLET, EXTENDED RELEASE ORAL DAILY
COMMUNITY

## 2025-05-08 RX ORDER — DENOSUMAB 60 MG/ML
INJECTION SUBCUTANEOUS
COMMUNITY
Start: 2023-04-17

## 2025-05-08 RX ORDER — METHYLPREDNISOLONE ACETATE 80 MG/ML
80 INJECTION, SUSPENSION INTRA-ARTICULAR; INTRALESIONAL; INTRAMUSCULAR; SOFT TISSUE
Status: COMPLETED | OUTPATIENT
Start: 2025-05-08 | End: 2025-05-08

## 2025-05-08 RX ADMIN — METHYLPREDNISOLONE ACETATE 80 MG: 80 INJECTION, SUSPENSION INTRA-ARTICULAR; INTRALESIONAL; INTRAMUSCULAR; SOFT TISSUE at 14:26

## 2025-05-08 NOTE — PROGRESS NOTES
Patient: Maria R Sandoval  YOB: 1935  Date of Service: 5/8/2025    Chief Complaints:   Chief Complaint   Patient presents with    Left Knee - Pain       Subjective: Patient of Dr. Johnson had a right total knee arthroplasty in the past who presents today for left knee pain.  She has followed with Dr. Kwan had some Biologics and PRP and is getting ready to go to graduations and would like to try cortisone injection today desires conservative treatment.    History of Present Illness: Pt is seen in the office today with complaints of   Chief Complaint   Patient presents with    Left Knee - Pain   .  KNEE: TIMING:  The pain is described as ACUTE on CHRONIC.  LOCATION: medial joint line tenderness. AGGRAVATING FACTORS:  Is worsened by prolonged standing, sitting, walking and squatting activities.  ASSOCIATED SYMPTOMS:  swelling, tenderness, and aching.    This problem is new to this examiner.     Allergies:   Allergies   Allergen Reactions    Simvastatin Unknown - Low Severity     Myalgia     Tramadol Hallucinations    Raloxifene Other (See Comments)     Other reaction(s): LEG CRAMPING    Risedronate Other (See Comments)    Codeine Sulfate Rash    Hydrocodone-Acetaminophen Rash       Medications:   Home Medications:  Current Outpatient Medications on File Prior to Visit   Medication Sig    Acetaminophen (TYLENOL 8 HOUR PO) Take  by mouth As Needed.    azelastine (ASTELIN) 0.1 % nasal spray Administer 2 sprays into the nostril(s) as directed by provider As Needed for Rhinitis. Use in each nostril as directed    CALCIUM PO Take  by mouth.    citalopram (CeleXA) 40 MG tablet Take 1 tablet by mouth Daily.    denosumab (Prolia) 60 MG/ML solution prefilled syringe syringe     fluticasone (FLONASE) 50 MCG/ACT nasal spray Administer 1 spray into the nostril(s) as directed by provider Daily.    rosuvastatin (CRESTOR) 5 MG tablet     vitamin D3 125 MCG (5000 UT) capsule capsule Take  by mouth.     amLODIPine-benazepril (LOTREL 2.5-10) 2.5-10 MG per capsule     gabapentin (NEURONTIN) 100 MG capsule Take 1 capsule by mouth.    Gemtesa 75 MG tablet  (Patient not taking: Reported on 5/8/2025)    meloxicam (MOBIC) 15 MG tablet Take 1 tablet by mouth Daily. (Patient not taking: Reported on 5/8/2025)    metoprolol succinate XL (TOPROL-XL) 25 MG 24 hr tablet Take 1 tablet by mouth Daily.    Mirabegron (MYRBETRIQ PO) Take  by mouth.    Multiple Vitamins-Calcium (ONE-A-DAY WOMENS PO) Take  by mouth. (Patient not taking: Reported on 5/8/2025)     No current facility-administered medications on file prior to visit.     Current Medications:  Scheduled Meds:  Continuous Infusions:No current facility-administered medications for this visit.    PRN Meds:.    I have reviewed the patient's medical history in detail and updated the computerized patient record.  Review and summarization of old records include:    Past Medical History:   Diagnosis Date    Arthritis     Carotid artery stenosis     Elevated cholesterol     Fracture of ankle     Right ankle    GERD (gastroesophageal reflux disease)     Hypertension     IBS (irritable bowel syndrome)     Osteopenia     Rotator cuff syndrome     Left shoulder repair    Vitamin D deficiency         Past Surgical History:   Procedure Laterality Date    ABDOMINOPLASTY N/A     ANKLE OPEN REDUCTION INTERNAL FIXATION      ANKLE SURGERY Right     BILATERAL BREAST REDUCTION      BREAST SURGERY      COLONOSCOPY N/A 12/07/2016    Procedure: COLONOSCOPY to terminal ileum;  Surgeon: Curtis Dillard MD;  Location: [facility] ENDOSCOPY;  Service:     ENDOSCOPY N/A 12/07/2016    Procedure: ESOPHAGOGASTRODUODENOSCOPY with bx;  Surgeon: Curtis Dillard MD;  Location: [facility] ENDOSCOPY;  Service:     ENDOSCOPY N/A 08/23/2019    Procedure: ESOPHAGOGASTRODUODENOSCOPY WITH DUODENAL ASPIRATE AND BIOPSY;  Surgeon: Curtis Dillard MD;  Location: [facility] ENDOSCOPY;  Service: Gastroenterology    EYE SURGERY Right      FACELIFT      HERNIA REPAIR      NISSEN FUNDOPLICATION      REPLACEMENT TOTAL KNEE Right     ROTATOR CUFF REPAIR Left     SHOULDER SURGERY      TONSILLECTOMY      WRIST SURGERY Right         Social History     Occupational History    Not on file   Tobacco Use    Smoking status: Former     Current packs/day: 0.00     Average packs/day: 1 pack/day for 30.0 years (30.0 ttl pk-yrs)     Types: Cigarettes     Start date: 1955     Quit date: 1985     Years since quittin.4     Passive exposure: Past    Smokeless tobacco: Never   Vaping Use    Vaping status: Never Used   Substance and Sexual Activity    Alcohol use: No    Drug use: No    Sexual activity: Not Currently      Social History     Social History Narrative    Not on file        Family History   Problem Relation Age of Onset    Hypertension Mother     Depression Mother     Alzheimer's disease Mother     Stroke Mother     Prostate cancer Father     Alcohol abuse Sister     Bipolar disorder Sister     Stroke Brother     Alcohol abuse Brother     Lymphoma Brother     Cancer Maternal Grandmother     Stomach cancer Maternal Grandmother     Liver cancer Maternal Grandmother     Diabetes Cousin        ROS: 14 point review of systems was performed and was negative except for documented findings in HPI and today's encounter.     Allergies:   Allergies   Allergen Reactions    Simvastatin Unknown - Low Severity     Myalgia     Tramadol Hallucinations    Raloxifene Other (See Comments)     Other reaction(s): LEG CRAMPING    Risedronate Other (See Comments)    Codeine Sulfate Rash    Hydrocodone-Acetaminophen Rash     Constitutional:  Denies fever, shaking or chills   Eyes:  Denies change in visual acuity   HENT:  Denies nasal congestion or sore throat   Respiratory:  Denies cough or shortness of breath   Cardiovascular:  Denies chest pain or severe LE edema   GI:  Denies abdominal pain, nausea, vomiting, bloody stools or diarrhea   Musculoskeletal:  Numbness,  tingling, or loss of motor function only as noted above in history of present illness.  : Denies painful urination or hematuria  Integument:  Denies rash, lesion or ulceration   Neurologic:  Denies headache or focal weakness  Endocrine:  Denies lymphadenopathy  Psych:  Denies confusion or change in mental status   Hem:  Denies active bleeding      Physical Exam: 89 y.o. female  Wt Readings from Last 3 Encounters:   05/08/25 73.5 kg (162 lb)   12/06/23 76 kg (167 lb 8 oz)   08/24/23 75.9 kg (167 lb 6.4 oz)       Body mass index is 28.7 kg/m².  Facility age limit for growth %norman is 20 years.  Vitals:    05/08/25 1340   Temp: 98.3 °F (36.8 °C)     Vital signs reviewed.   General Appearance:    Alert, cooperative, in no acute distress                  Eyes: conjunctiva clear  ENT: external ears and nose atraumatic  CV: no peripheral edema  Resp: normal respiratory effort  Skin: no rashes or wounds; normal turgor  Psych: mood and affect appropriate  Lymph: no nodes appreciated  Neuro: gross sensation intact  Vascular:  Palpable peripheral pulse in noted extremity  Musculoskeletal Extremities: KNEE Exam: medial and lateral joint line tenderness with crepitation, synovitis, swelling, and joint effusion left knee.      Diagnostic Data:  Imaging done today and discussed with the patient:    Indication: pain related symptoms,  Views: 3V AP, LAT & 40 degree PA left knee(s)   Findings: severe end-stage arthritis (bone on bone, subchondral sclerosis/cysts, osteophytes)  Comparison views: viewed last xray done in the office.      Procedure:  Large Joint Arthrocentesis: L knee  Date/Time: 5/8/2025 2:26 PM  Consent given by: patient  Site marked: site marked  Timeout: Immediately prior to procedure a time out was called to verify the correct patient, procedure, equipment, support staff and site/side marked as required   Supporting Documentation  Indications: pain and joint swelling   Procedure Details  Location: knee - L  knee  Preparation: Patient was prepped and draped in the usual sterile fashion  Needle gauge: 21 G.  Approach: anterolateral  Medications administered: 2 mL lidocaine (cardiac); 80 mg methylPREDNISolone acetate 80 MG/ML  Patient tolerance: patient tolerated the procedure well with no immediate complications        Assessment:     ICD-10-CM ICD-9-CM   1. Pain  R52 780.96   2. Primary osteoarthritis of left knee  M17.12 715.16       Plan:   Follow up as indicated.  Ice, elevate, and rest as needed.  The diagnosis(es), natural history, pathophysiology and treatment for diagnosis(es) were discussed. Opportunity given and questions answered.  Biomechanics of pertinent body areas discussed.  When appropriate, the use of ambulatory aids discussed.  EXERCISES:  Advice on benefits of, and types of regular/moderate exercise pertaining to orthopedic diagnosis(es).  MEDICATIONS:  The risks, benefits, warnings,side effects and alternatives of medications discussed.  Inflammation/pain control; with cold, heat, elevation and/or liniments discussed as appropriate  Cortisone Injection. See procedure note.  MEDICAL RECORDS reviewed from other provider(s) for past and current medical history pertinent to this complaint.    5/8/2025

## 2025-07-10 ENCOUNTER — TELEPHONE (OUTPATIENT)
Dept: ORTHOPEDIC SURGERY | Facility: CLINIC | Age: 89
End: 2025-07-10

## 2025-08-07 ENCOUNTER — OFFICE VISIT (OUTPATIENT)
Dept: ORTHOPEDIC SURGERY | Facility: CLINIC | Age: 89
End: 2025-08-07
Payer: MEDICARE

## 2025-08-07 VITALS — WEIGHT: 162 LBS | HEIGHT: 63 IN | BODY MASS INDEX: 28.7 KG/M2 | TEMPERATURE: 98.2 F

## 2025-08-07 DIAGNOSIS — M17.12 PRIMARY OSTEOARTHRITIS OF LEFT KNEE: Primary | ICD-10-CM

## 2025-08-07 PROCEDURE — 99214 OFFICE O/P EST MOD 30 MIN: CPT | Performed by: NURSE PRACTITIONER

## 2025-08-07 RX ORDER — CELECOXIB 100 MG/1
CAPSULE ORAL
COMMUNITY

## (undated) DEVICE — SENSR O2 OXIMAX FNGR A/ 18IN NONSTR

## (undated) DEVICE — Device: Brand: DEFENDO AIR/WATER/SUCTION AND BIOPSY VALVE

## (undated) DEVICE — BITEBLOCK OMNI BLOC

## (undated) DEVICE — CANN NASL CO2 TRULINK W/O2 A/

## (undated) DEVICE — CATH ASP DUODENAL 2.5MM 180CM

## (undated) DEVICE — FRCP BX RADJAW4 NDL 2.8 240CM LG OG BX40

## (undated) DEVICE — TUBING, SUCTION, 1/4" X 10', STRAIGHT: Brand: MEDLINE